# Patient Record
Sex: MALE | Race: WHITE
[De-identification: names, ages, dates, MRNs, and addresses within clinical notes are randomized per-mention and may not be internally consistent; named-entity substitution may affect disease eponyms.]

---

## 2018-03-20 ENCOUNTER — HOSPITAL ENCOUNTER (INPATIENT)
Dept: HOSPITAL 17 - NEPC | Age: 68
LOS: 3 days | Discharge: NURSING, REHAB OR LONG-TERM CARE | DRG: 623 | End: 2018-03-23
Attending: HOSPITALIST | Admitting: HOSPITALIST
Payer: MEDICARE

## 2018-03-20 VITALS
HEART RATE: 76 BPM | DIASTOLIC BLOOD PRESSURE: 61 MMHG | TEMPERATURE: 98 F | RESPIRATION RATE: 17 BRPM | OXYGEN SATURATION: 99 % | SYSTOLIC BLOOD PRESSURE: 116 MMHG

## 2018-03-20 VITALS
HEART RATE: 125 BPM | SYSTOLIC BLOOD PRESSURE: 143 MMHG | OXYGEN SATURATION: 100 % | RESPIRATION RATE: 33 BRPM | DIASTOLIC BLOOD PRESSURE: 96 MMHG

## 2018-03-20 VITALS
HEART RATE: 126 BPM | SYSTOLIC BLOOD PRESSURE: 159 MMHG | OXYGEN SATURATION: 100 % | RESPIRATION RATE: 30 BRPM | DIASTOLIC BLOOD PRESSURE: 74 MMHG

## 2018-03-20 VITALS
SYSTOLIC BLOOD PRESSURE: 116 MMHG | RESPIRATION RATE: 17 BRPM | DIASTOLIC BLOOD PRESSURE: 61 MMHG | OXYGEN SATURATION: 99 % | HEART RATE: 92 BPM | TEMPERATURE: 97.1 F

## 2018-03-20 VITALS
RESPIRATION RATE: 23 BRPM | OXYGEN SATURATION: 100 % | DIASTOLIC BLOOD PRESSURE: 59 MMHG | SYSTOLIC BLOOD PRESSURE: 125 MMHG | HEART RATE: 118 BPM

## 2018-03-20 VITALS
SYSTOLIC BLOOD PRESSURE: 151 MMHG | DIASTOLIC BLOOD PRESSURE: 65 MMHG | HEART RATE: 132 BPM | RESPIRATION RATE: 36 BRPM | OXYGEN SATURATION: 100 %

## 2018-03-20 VITALS
DIASTOLIC BLOOD PRESSURE: 62 MMHG | SYSTOLIC BLOOD PRESSURE: 133 MMHG | RESPIRATION RATE: 18 BRPM | HEART RATE: 78 BPM | TEMPERATURE: 98.2 F | OXYGEN SATURATION: 97 %

## 2018-03-20 VITALS — HEART RATE: 90 BPM

## 2018-03-20 VITALS
OXYGEN SATURATION: 100 % | RESPIRATION RATE: 29 BRPM | HEART RATE: 126 BPM | DIASTOLIC BLOOD PRESSURE: 66 MMHG | SYSTOLIC BLOOD PRESSURE: 149 MMHG

## 2018-03-20 VITALS
SYSTOLIC BLOOD PRESSURE: 116 MMHG | DIASTOLIC BLOOD PRESSURE: 55 MMHG | RESPIRATION RATE: 18 BRPM | HEART RATE: 106 BPM | OXYGEN SATURATION: 100 %

## 2018-03-20 VITALS
TEMPERATURE: 98.7 F | RESPIRATION RATE: 25 BRPM | OXYGEN SATURATION: 97 % | SYSTOLIC BLOOD PRESSURE: 101 MMHG | HEART RATE: 82 BPM | DIASTOLIC BLOOD PRESSURE: 55 MMHG

## 2018-03-20 VITALS — WEIGHT: 165.35 LBS | HEIGHT: 70 IN | BODY MASS INDEX: 23.67 KG/M2

## 2018-03-20 VITALS
SYSTOLIC BLOOD PRESSURE: 157 MMHG | RESPIRATION RATE: 30 BRPM | DIASTOLIC BLOOD PRESSURE: 87 MMHG | OXYGEN SATURATION: 100 % | HEART RATE: 129 BPM

## 2018-03-20 VITALS
OXYGEN SATURATION: 100 % | RESPIRATION RATE: 24 BRPM | SYSTOLIC BLOOD PRESSURE: 129 MMHG | HEART RATE: 120 BPM | DIASTOLIC BLOOD PRESSURE: 65 MMHG

## 2018-03-20 VITALS
DIASTOLIC BLOOD PRESSURE: 56 MMHG | HEART RATE: 112 BPM | SYSTOLIC BLOOD PRESSURE: 118 MMHG | RESPIRATION RATE: 20 BRPM | OXYGEN SATURATION: 100 %

## 2018-03-20 VITALS — HEART RATE: 83 BPM

## 2018-03-20 VITALS — SYSTOLIC BLOOD PRESSURE: 114 MMHG | DIASTOLIC BLOOD PRESSURE: 58 MMHG

## 2018-03-20 VITALS — HEART RATE: 87 BPM

## 2018-03-20 VITALS — HEART RATE: 79 BPM

## 2018-03-20 VITALS — HEART RATE: 92 BPM

## 2018-03-20 DIAGNOSIS — Z79.84: ICD-10-CM

## 2018-03-20 DIAGNOSIS — Z79.4: ICD-10-CM

## 2018-03-20 DIAGNOSIS — E11.10: Primary | ICD-10-CM

## 2018-03-20 DIAGNOSIS — Z79.82: ICD-10-CM

## 2018-03-20 DIAGNOSIS — N17.9: ICD-10-CM

## 2018-03-20 DIAGNOSIS — I10: ICD-10-CM

## 2018-03-20 DIAGNOSIS — E86.0: ICD-10-CM

## 2018-03-20 DIAGNOSIS — L89.150: ICD-10-CM

## 2018-03-20 LAB
ALBUMIN SERPL-MCNC: 2.7 GM/DL (ref 3.4–5)
ALP SERPL-CCNC: 103 U/L (ref 45–117)
ALT SERPL-CCNC: 41 U/L (ref 12–78)
AST SERPL-CCNC: 39 U/L (ref 15–37)
BACTERIA #/AREA URNS HPF: (no result) /HPF
BASOPHILS # BLD AUTO: 0.1 TH/MM3 (ref 0–0.2)
BASOPHILS NFR BLD: 0.5 % (ref 0–2)
BILIRUB SERPL-MCNC: 1 MG/DL (ref 0.2–1)
BUN SERPL-MCNC: 20 MG/DL (ref 7–18)
BUN SERPL-MCNC: 21 MG/DL (ref 7–18)
BUN SERPL-MCNC: 22 MG/DL (ref 7–18)
BUN SERPL-MCNC: 23 MG/DL (ref 7–18)
BUN SERPL-MCNC: 25 MG/DL (ref 7–18)
CALCIUM SERPL-MCNC: 7.5 MG/DL (ref 8.5–10.1)
CALCIUM SERPL-MCNC: 7.5 MG/DL (ref 8.5–10.1)
CALCIUM SERPL-MCNC: 8 MG/DL (ref 8.5–10.1)
CALCIUM SERPL-MCNC: 8.3 MG/DL (ref 8.5–10.1)
CALCIUM SERPL-MCNC: 8.5 MG/DL (ref 8.5–10.1)
CHLORIDE SERPL-SCNC: 104 MEQ/L (ref 98–107)
CHLORIDE SERPL-SCNC: 104 MEQ/L (ref 98–107)
CHLORIDE SERPL-SCNC: 108 MEQ/L (ref 98–107)
CHLORIDE SERPL-SCNC: 108 MEQ/L (ref 98–107)
CHLORIDE SERPL-SCNC: 91 MEQ/L (ref 98–107)
COLOR UR: (no result)
CREAT SERPL-MCNC: 1.2 MG/DL (ref 0.6–1.3)
CREAT SERPL-MCNC: 1.21 MG/DL (ref 0.6–1.3)
CREAT SERPL-MCNC: 1.63 MG/DL (ref 0.6–1.3)
CREAT SERPL-MCNC: 1.65 MG/DL (ref 0.6–1.3)
CREAT SERPL-MCNC: 1.93 MG/DL (ref 0.6–1.3)
EOSINOPHIL # BLD: 0.1 TH/MM3 (ref 0–0.4)
EOSINOPHIL NFR BLD: 0.4 % (ref 0–4)
ERYTHROCYTE [DISTWIDTH] IN BLOOD BY AUTOMATED COUNT: 18.3 % (ref 11.6–17.2)
GFR SERPLBLD BASED ON 1.73 SQ M-ARVRAT: 35 ML/MIN (ref 89–?)
GFR SERPLBLD BASED ON 1.73 SQ M-ARVRAT: 42 ML/MIN (ref 89–?)
GFR SERPLBLD BASED ON 1.73 SQ M-ARVRAT: 42 ML/MIN (ref 89–?)
GFR SERPLBLD BASED ON 1.73 SQ M-ARVRAT: 60 ML/MIN (ref 89–?)
GFR SERPLBLD BASED ON 1.73 SQ M-ARVRAT: 60 ML/MIN (ref 89–?)
GLUCOSE SERPL-MCNC: 143 MG/DL (ref 74–106)
GLUCOSE SERPL-MCNC: 146 MG/DL (ref 74–106)
GLUCOSE SERPL-MCNC: 345 MG/DL (ref 74–106)
GLUCOSE SERPL-MCNC: 354 MG/DL (ref 74–106)
GLUCOSE SERPL-MCNC: 709 MG/DL (ref 74–106)
GLUCOSE UR STRIP-MCNC: 1000 MG/DL
HBA1C MFR BLD: 6.4 % (ref 4.3–6)
HCO3 BLD-SCNC: 22 MEQ/L (ref 21–32)
HCO3 BLD-SCNC: 22.1 MEQ/L (ref 21–32)
HCO3 BLD-SCNC: 27 MEQ/L (ref 21–32)
HCO3 BLD-SCNC: 27.2 MEQ/L (ref 21–32)
HCO3 BLD-SCNC: 5.2 MEQ/L (ref 21–32)
HCT VFR BLD CALC: 40 % (ref 39–51)
HGB BLD-MCNC: 11.9 GM/DL (ref 13–17)
HGB UR QL STRIP: (no result)
HYALINE CASTS #/AREA URNS LPF: 3 /LPF
INR PPP: 1.3 RATIO
KETONES UR STRIP-MCNC: 80 MG/DL
LACTIC ACID SEPSIS PROTOCOL: 10.7 MMOL/L (ref 0.4–2)
LYMPHOCYTES # BLD AUTO: 1.3 TH/MM3 (ref 1–4.8)
LYMPHOCYTES NFR BLD AUTO: 6.3 % (ref 9–44)
MAGNESIUM SERPL-MCNC: 1.5 MG/DL (ref 1.5–2.5)
MAGNESIUM SERPL-MCNC: 1.5 MG/DL (ref 1.5–2.5)
MAGNESIUM SERPL-MCNC: 1.7 MG/DL (ref 1.5–2.5)
MAGNESIUM SERPL-MCNC: 1.7 MG/DL (ref 1.5–2.5)
MAGNESIUM SERPL-MCNC: 1.9 MG/DL (ref 1.5–2.5)
MCH RBC QN AUTO: 32.5 PG (ref 27–34)
MCHC RBC AUTO-ENTMCNC: 29.6 % (ref 32–36)
MCV RBC AUTO: 109.6 FL (ref 80–100)
MONOCYTE #: 0.7 TH/MM3 (ref 0–0.9)
MONOCYTES NFR BLD: 3.3 % (ref 0–8)
MUCOUS THREADS #/AREA URNS LPF: (no result) /LPF
NEUTROPHILS # BLD AUTO: 18.4 TH/MM3 (ref 1.8–7.7)
NEUTROPHILS NFR BLD AUTO: 89.5 % (ref 16–70)
NITRITE UR QL STRIP: (no result)
PHOSPHATE SERPL-MCNC: 1.5 MG/DL (ref 2.5–4.9)
PHOSPHATE SERPL-MCNC: 1.5 MG/DL (ref 2.5–4.9)
PHOSPHATE SERPL-MCNC: 2 MG/DL (ref 2.5–4.9)
PHOSPHATE SERPL-MCNC: 2.1 MG/DL (ref 2.5–4.9)
PLATELET # BLD: 264 TH/MM3 (ref 150–450)
PMV BLD AUTO: 8.6 FL (ref 7–11)
PROT SERPL-MCNC: 6.2 GM/DL (ref 6.4–8.2)
PROTHROMBIN TIME: 13.1 SEC (ref 9.8–11.6)
RBC # BLD AUTO: 3.65 MIL/MM3 (ref 4.5–5.9)
SODIUM SERPL-SCNC: 133 MEQ/L (ref 136–145)
SODIUM SERPL-SCNC: 139 MEQ/L (ref 136–145)
SODIUM SERPL-SCNC: 140 MEQ/L (ref 136–145)
SODIUM SERPL-SCNC: 143 MEQ/L (ref 136–145)
SODIUM SERPL-SCNC: 143 MEQ/L (ref 136–145)
SP GR UR STRIP: 1.01 (ref 1–1.03)
TROPONIN I SERPL-MCNC: 0.03 NG/ML (ref 0.02–0.05)
URINE LEUKOCYTE ESTERASE: (no result)
WBC # BLD AUTO: 20.5 TH/MM3 (ref 4–11)

## 2018-03-20 PROCEDURE — 85730 THROMBOPLASTIN TIME PARTIAL: CPT

## 2018-03-20 PROCEDURE — 82948 REAGENT STRIP/BLOOD GLUCOSE: CPT

## 2018-03-20 PROCEDURE — 87205 SMEAR GRAM STAIN: CPT

## 2018-03-20 PROCEDURE — 74176 CT ABD & PELVIS W/O CONTRAST: CPT

## 2018-03-20 PROCEDURE — 81001 URINALYSIS AUTO W/SCOPE: CPT

## 2018-03-20 PROCEDURE — 83880 ASSAY OF NATRIURETIC PEPTIDE: CPT

## 2018-03-20 PROCEDURE — 85025 COMPLETE CBC W/AUTO DIFF WBC: CPT

## 2018-03-20 PROCEDURE — 87641 MR-STAPH DNA AMP PROBE: CPT

## 2018-03-20 PROCEDURE — 51702 INSERT TEMP BLADDER CATH: CPT

## 2018-03-20 PROCEDURE — 84484 ASSAY OF TROPONIN QUANT: CPT

## 2018-03-20 PROCEDURE — 80048 BASIC METABOLIC PNL TOTAL CA: CPT

## 2018-03-20 PROCEDURE — 82550 ASSAY OF CK (CPK): CPT

## 2018-03-20 PROCEDURE — 82805 BLOOD GASES W/O2 SATURATION: CPT

## 2018-03-20 PROCEDURE — 82800 BLOOD PH: CPT

## 2018-03-20 PROCEDURE — 86403 PARTICLE AGGLUT ANTBDY SCRN: CPT

## 2018-03-20 PROCEDURE — 82010 KETONE BODYS QUAN: CPT

## 2018-03-20 PROCEDURE — 87086 URINE CULTURE/COLONY COUNT: CPT

## 2018-03-20 PROCEDURE — 96375 TX/PRO/DX INJ NEW DRUG ADDON: CPT

## 2018-03-20 PROCEDURE — 87040 BLOOD CULTURE FOR BACTERIA: CPT

## 2018-03-20 PROCEDURE — 83605 ASSAY OF LACTIC ACID: CPT

## 2018-03-20 PROCEDURE — 85610 PROTHROMBIN TIME: CPT

## 2018-03-20 PROCEDURE — 80053 COMPREHEN METABOLIC PANEL: CPT

## 2018-03-20 PROCEDURE — 84100 ASSAY OF PHOSPHORUS: CPT

## 2018-03-20 PROCEDURE — 83735 ASSAY OF MAGNESIUM: CPT

## 2018-03-20 PROCEDURE — 93005 ELECTROCARDIOGRAM TRACING: CPT

## 2018-03-20 PROCEDURE — 96361 HYDRATE IV INFUSION ADD-ON: CPT

## 2018-03-20 PROCEDURE — 71045 X-RAY EXAM CHEST 1 VIEW: CPT

## 2018-03-20 PROCEDURE — 82552 ASSAY OF CPK IN BLOOD: CPT

## 2018-03-20 PROCEDURE — 83036 HEMOGLOBIN GLYCOSYLATED A1C: CPT

## 2018-03-20 PROCEDURE — 87070 CULTURE OTHR SPECIMN AEROBIC: CPT

## 2018-03-20 PROCEDURE — 96374 THER/PROPH/DIAG INJ IV PUSH: CPT

## 2018-03-20 RX ADMIN — METOPROLOL TARTRATE SCH MG: 25 TABLET, FILM COATED ORAL at 23:00

## 2018-03-20 RX ADMIN — FOLIC ACID SCH MG: 1 TABLET ORAL at 09:23

## 2018-03-20 RX ADMIN — METOPROLOL TARTRATE SCH MG: 25 TABLET, FILM COATED ORAL at 09:23

## 2018-03-20 RX ADMIN — SODIUM BICARBONATE SCH MLS/HR: 84 INJECTION, SOLUTION INTRAVENOUS at 13:26

## 2018-03-20 RX ADMIN — FAMOTIDINE SCH MG: 10 INJECTION, SOLUTION INTRAVENOUS at 09:24

## 2018-03-20 RX ADMIN — GABAPENTIN SCH MG: 100 CAPSULE ORAL at 09:22

## 2018-03-20 RX ADMIN — PHENYTOIN SODIUM SCH MLS/HR: 50 INJECTION INTRAMUSCULAR; INTRAVENOUS at 11:45

## 2018-03-20 RX ADMIN — POTASSIUM CHLORIDE PRN MLS/HR: 200 INJECTION, SOLUTION INTRAVENOUS at 12:25

## 2018-03-20 RX ADMIN — MORPHINE SULFATE PRN MG: 2 INJECTION, SOLUTION INTRAMUSCULAR; INTRAVENOUS at 23:00

## 2018-03-20 RX ADMIN — MORPHINE SULFATE PRN MG: 2 INJECTION, SOLUTION INTRAMUSCULAR; INTRAVENOUS at 20:59

## 2018-03-20 RX ADMIN — SODIUM BICARBONATE SCH MLS/HR: 84 INJECTION, SOLUTION INTRAVENOUS at 08:43

## 2018-03-20 RX ADMIN — POTASSIUM CHLORIDE PRN MLS/HR: 200 INJECTION, SOLUTION INTRAVENOUS at 14:35

## 2018-03-20 RX ADMIN — ENOXAPARIN SODIUM SCH MG: 30 INJECTION SUBCUTANEOUS at 22:30

## 2018-03-20 RX ADMIN — GABAPENTIN SCH MG: 100 CAPSULE ORAL at 18:50

## 2018-03-20 RX ADMIN — ACYCLOVIR SCH UNITS: 800 TABLET ORAL at 21:00

## 2018-03-20 RX ADMIN — PHENYTOIN SODIUM SCH MLS/HR: 50 INJECTION INTRAMUSCULAR; INTRAVENOUS at 14:29

## 2018-03-20 RX ADMIN — ACYCLOVIR SCH UNITS: 800 TABLET ORAL at 22:31

## 2018-03-20 RX ADMIN — ASPIRIN SCH MG: 325 TABLET ORAL at 09:23

## 2018-03-20 RX ADMIN — SODIUM BICARBONATE SCH MLS/HR: 84 INJECTION, SOLUTION INTRAVENOUS at 18:49

## 2018-03-20 RX ADMIN — PHENYTOIN SODIUM SCH MLS/HR: 50 INJECTION INTRAMUSCULAR; INTRAVENOUS at 03:50

## 2018-03-20 RX ADMIN — STANDARDIZED SENNA CONCENTRATE AND DOCUSATE SODIUM SCH TAB: 8.6; 5 TABLET, FILM COATED ORAL at 23:00

## 2018-03-20 RX ADMIN — PHENYTOIN SODIUM SCH MLS/HR: 50 INJECTION INTRAMUSCULAR; INTRAVENOUS at 09:48

## 2018-03-20 RX ADMIN — SODIUM CHLORIDE AND POTASSIUM CHLORIDE SCH MLS/HR: 9; 1.49 INJECTION, SOLUTION INTRAVENOUS at 21:00

## 2018-03-20 RX ADMIN — INSULIN ASPART SCH: 100 INJECTION, SOLUTION INTRAVENOUS; SUBCUTANEOUS at 22:31

## 2018-03-20 RX ADMIN — FAMOTIDINE SCH MG: 10 INJECTION, SOLUTION INTRAVENOUS at 20:59

## 2018-03-20 RX ADMIN — Medication SCH MG: at 09:23

## 2018-03-20 RX ADMIN — GABAPENTIN SCH MG: 100 CAPSULE ORAL at 12:24

## 2018-03-20 RX ADMIN — STANDARDIZED SENNA CONCENTRATE AND DOCUSATE SODIUM SCH TAB: 8.6; 5 TABLET, FILM COATED ORAL at 09:23

## 2018-03-20 RX ADMIN — ENOXAPARIN SODIUM SCH MG: 30 INJECTION SUBCUTANEOUS at 09:23

## 2018-03-20 NOTE — EKG
Date Performed: 03/20/2018       Time Performed: 12:44:22

 

PTAGE:      68 years

 

EKG:      Sinus rhythm 

 

 NORMAL ECG

 

PREVIOUS TRACING       : 03/20/2018 03.31 Compared to previous tracing, heart rate has decreased.

 

DOCTOR:   Wellington Paiz  Interpretating Date/Time  03/20/2018 14:37:06
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
## 2018-03-20 NOTE — HHI.HP
HPI


Service


Critical Care Medicine


Primary Care Physician


Tod Barreto, DO


Admission Diagnosis





DKA, leukocytosis


Diagnosis:  


Travel History


International Travel<30 Days:  No


Contact w/Intl Traveler <30 Da:  No


Traveled to Known Affected Are:  No


History of Present Illness


68 year old male presents with a history altered mentation noted at 2 AM by the 

nursing home staff when they went to check his blood sugar.  The patient's 

blood sugar was noted to be critically high.  They administered 10 units of 

insulin subcutaneously and called the physician on call.  Given the patient's 

altered mentation he recommended that the patient be taken to the emergency 

department.  Upon ambulance services arrival the patient was noted to be 

tachypneic with a critically high blood sugar.  The patient was noted to have 

an end-tidal CO2 between 8 and 10.  The patient's heart rate was noted to be 

280 and he appeared to be and ventricular tachycardia.  The patient 

spontaneously converted to a sinus rhythm.  The patient continues to be 

altered.  The initial workup in the emergency department show severe DKA and 

the patient is admitted to ICU.





Review of Systems


ROS


Unable to obtain due to altered mental status





Past Family Social History


Allergies:  


Coded Allergies:  


     No Known Allergies (Verified  Allergy, Unknown, 3/20/18)


Past Medical History


Hypertension


Diabetes


History of subdural hemorrhage and subarachnoid hemorrhagein 


History of hepatitis Creports this was treated more than 10 years ago


Past Surgical History


Left femoral fracture IM rods


Left knee surgery


Halo placement: 


C6-C7 fracture subluxation with spinal cord injurystatus post anterior and 

posterior decompression and fusion


Anterior cervical C5 to C6, and C6 to C7 ventral epidural hemorrhage evacuation


Removal of anterior C5 to C7 cervical plate with replacement


Removal of C5 to C6 and C6 to C7 interbody cages with replacement


Reported Medications





Reported Meds & Active Scripts


Active


Norco 5-325 mg (Hydrocodone-Acetaminophen 5-325 mg) 1 Tab 1 Tab PO Q8 PRN


Trazodone Hcl (Trazodone HCl) 50 Mg Tab 50 Mg PO HS


Abilify 20 mg (Aripiprazole) 20 Mg Tab 20 Mg PO HS 30 Days


Metoprolol Tartrate 25 mg (Metoprolol Tartrate) 25 Mg Tab 25 Mg PO Q12HR 30 Days


Lovenox (Enoxaparin Sodium) 30 Mg/0.3 Ml Inj 30 Mg SQ Q12H 30 Days


Aspirin 325 Mg Tab (Aspirin) 325 Mg Tab 325 Mg PO DAILY 30 Days


Reported


Glucophage 500 mg (Metformin HCl) 500 Mg Tab 500 Mg PO BIDPC


Mapap (Acetaminophen) 325 Mg Tab 650 Mg PO Q6H PRN


Thiamine HCl 100 Mg Tab 100 Mg PO DAILY


Miralax 119 Gm Bottle (Polyethylene Glycol) 119 Gm Powd 17 Gm PO DAILY


     17 GRAMS = 1 TABLESPOON DISSOLVED IN 4 TO 8 OUNCES OF BEVERAGE


Lisinopril 5 mg (Lisinopril) 5 Mg Tab 5 Mg PO BID


Humalog Insulin Supplemental Scale (Insulin Human Lispro) 100 Units/Ml  Inj 

Unknown Dose SQ TIDACHS


     Low Dose Lispro Insulin Sliding Scale = Max dose at bedtime:(  )units;


     Max dose at 3am:(  ); blood sugars less than 70 take zero insulin


     units; blood sugars 150-199 take 1 unit; blood sugars 200-249 take 3


     units; blood sugars 250-299 take 5 units; blood sugars 300-349 take 7


     units; blood sugars greater than 349 take 9 units


Lantus (Insulin Glargine) 100 Units/Ml Inj 27 Unit SQ DAILY


Gabapentin 100 Mg Cap 200 Mg PO TID


Folate 1 Mg Tab (Folic Acid) 1 Mg Tab 1 Mg PO DAILY


Colace 100 Mg Cap (Docusate Sodium) 100 Mg Cap 100 Mg PO BID


Active Ordered Medications





Current Medications








 Medications


  (Trade)  Dose


 Ordered  Sig/Oral


 Route


 PRN Reason  Start Time


 Stop Time Status Last Admin


Dose Admin


 


 Sodium Chloride  1,000 ml @ 


 250 mls/hr  Q4H


 IV


   3/20/18 03:45


    3/20/18 03:50


 


 


 Dextrose/Sodium


 Chloride  1,000 ml @ 


 200 mls/hr  Q5H


 IV


   3/20/18 03:45


     


 


 


 Insulin Human


 Regular 100 units/


 Sodium Chloride  100 ml @ 


 6.5 mls/hr  TITRATE  PRN


 IV


 Blood Sugar Management  3/20/18 03:45


     


 


 


 Potassium Chloride  100 ml @ 


 100 mls/hr  Q1H  PRN


 IV


 SEE LABEL COMMENTS  3/20/18 03:45


     


 


 


 Potassium Chloride  100 ml @ 


 50 mls/hr  Q2H  PRN


 IV


 SEE LABEL COMMENTS  3/20/18 03:45


     


 


 


 Potassium Chloride  100 ml @ 


 100 mls/hr  Q1H  PRN


 IV


 SEE LABEL COMMENTS  3/20/18 03:45


     


 


 


 Potassium Chloride  100 ml @ 


 100 mls/hr  Q1H  PRN


 IV


 SEE LABEL COMMENTS  3/20/18 03:45


     


 


 


 Potassium Chloride  100 ml @ 


 50 mls/hr  Q2H  PRN


 IV


 SEE LABEL COMMENTS  3/20/18 03:45


     


 


 


 Potassium Chloride  100 ml @ 


 50 mls/hr  Q2H  PRN


 IV


 SEE LABEL COMMENTS  3/20/18 03:45


     


 


 


 Potassium Chloride  100 ml @ 


 50 mls/hr  Q2H  PRN


 IV


 SEE LABEL COMMENTS  3/20/18 03:45


     


 


 


 Potassium Chloride  100 ml @ 


 50 mls/hr  Q2H  PRN


 IV


 SEE LABEL COMMENTS  3/20/18 03:45


     


 


 


 Sodium Bicarbonate


  (Sodium


 Bicarbonate 8.4%


 Inj)  100 meq  UNSCH  PRN


 IV PUSH


 SEE LABEL COMMENTS  3/20/18 03:45


     


 


 


 Sodium Bicarbonate


  (Sodium


 Bicarbonate 8.4%


 Inj)  50 meq  UNSCH  PRN


 IV PUSH


 SEE LABEL COMMENTS  3/20/18 03:45


     


 


 


 Sodium Phosphate


 15 mmol/Sodium


 Chloride  105 ml @ 


 25 mls/hr  UNSCH  PRN


 IV


 SEE LABEL COMMENTS  3/20/18 03:45


     


 


 


 Vancomycin HCl


 1000 mg/Sodium


 Chloride  250 ml @ 


 250 mls/hr  ONCE  ONCE


 IV


   3/20/18 05:00


 3/20/18 05:59   


 


 


 Acetaminophen


  (Tylenol)  650 mg  Q6H  PRN


 PO


 MILD PAIN  3/20/18 05:30


   UNV  


 


 


 Aripiprazole


  (Abilify)  20 mg  HS


 PO


   3/20/18 21:00


   UNV  


 


 


 Aspirin


  (Aspirin)  325 mg  DAILY


 PO


   3/20/18 09:00


   UNV  


 


 


 Enoxaparin Sodium


  (Lovenox Inj)  30 mg  Q12H


 SQ


   3/20/18 05:30


   UNV  


 


 


 Folic Acid


  (Folate)  1 mg  DAILY


 PO


   3/20/18 09:00


   UNV  


 


 


 Gabapentin


  (Neurontin)  200 mg  TID


 PO


   3/20/18 09:00


   UNV  


 


 


 Metoprolol


 Tartrate


  (Lopressor)  25 mg  Q12HR


 PO


   3/20/18 09:00


   UNV  


 


 


 Thiamine HCl


  (Vitamin B1)  100 mg  DAILY


 PO


   3/20/18 09:00


   UNV  


 


 


 Trazodone HCl


  (Desyrel)  50 mg  HS


 PO


   3/20/18 21:00


   UNV  


 


 


 Sodium Chloride


  (NS Flush)  2 ml  UNSCH  PRN


 IV FLUSH


 FLUSH AFTER USING IV ACCESS  3/20/18 05:30


   UNV  


 


 


 Sodium Chloride


  (NS Flush)  2 ml  BID


 IV FLUSH


   3/20/18 09:00


   UNV  


 


 


 Acetaminophen


  (Tylenol)  650 mg  Q6H  PRN


 PO


 PAIN 1-5 AND/OR FEVER >101F  3/20/18 05:30


   UNV  


 


 


 Morphine Sulfate


  (Morphine Inj)  2 mg  Q2H  PRN


 IV PUSH


 PAIN SCALE 6 TO 10  3/20/18 05:30


   UNV  


 


 


 Famotidine


  (Pepcid Inj)  20 mg  Q12HR


 IV PUSH


   3/20/18 09:00


   UNV  


 


 


 Temazepam


  (Restoril)  15 mg  HS  PRN


 PO


 INSOMNIA  3/20/18 05:30


   UNV  


 


 


 Albuterol/


 Ipratropium


  (Duoneb Neb)  1 ampule  Q2HR NEB  PRN


 INH


 WHEEZING  3/20/18 05:30


   UNV  


 


 


 Miscellaneous


 Information  1  Q361D


 XX


   3/20/18 05:30


   UNV  


 


 


 Chlorhexidine


 Gluconate


  (Chlorhexidine


 2% Cloth)  3 pack


 Taper  DAILY@04


 TOP


   3/21/18 04:00


 3/17/19 03:59 UNV  


 


 


 Chlorhexidine


 Gluconate


  (Chlorhexidine


 2% Cloth)  3 pack  UNSCH  PRN


 TOP


 HYGIENIC CARE  3/20/18 05:30


   UNV  


 


 


 Senna/Docusate


 Sodium


  (Lin-Colace)  1 tab  BID


 PO


   3/20/18 09:00


   UNV  


 


 


 Magnesium


 Hydroxide


  (Milk Of


 Magnesia Liq)  30 ml  Q12H  PRN


 PO


 Mild constipation  3/20/18 05:30


   UNV  


 


 


 Sennosides


  (Senokot)  17.2 mg  Q12H  PRN


 PO


 Moderate constipation  3/20/18 05:30


   UNV  


 


 


 Bisacodyl


  (Dulcolax Supp)  10 mg  DAILY  PRN


 RECTAL


 SEVERE CONSITIPATION  3/20/18 05:30


   UNV  


 


 


 Lactulose


  (Lactulose Liq)  30 ml  DAILY  PRN


 PO


 SEVERE CONSITIPATION  3/20/18 05:30


   UNV  


 


 


 Sodium Chloride  1,000 ml @ 


 250 mls/hr  Q4H


 IV


   3/20/18 05:29


   UNV  


 


 


 Dextrose/Sodium


 Chloride  1,000 ml @ 


 200 mls/hr  Q5H


 IV


   3/20/18 05:29


   UNV  


 


 


 Insulin Human


 Regular 100 units/


 Sodium Chloride  100 ml @ 


 6.5 mls/hr  TITRATE  PRN


 IV


 Blood Glucose Control  3/20/18 05:30


   UNV  


 


 


 Potassium Chloride  100 ml @ 


 100 mls/hr  Q1H  PRN


 IV


 SEE LABEL COMMENTS  3/20/18 05:30


   UNV  


 


 


 Potassium Chloride  100 ml @ 


 50 mls/hr  Q2H  PRN


 IV


 SEE LABEL COMMENTS  3/20/18 05:30


   UNV  


 


 


 Potassium Chloride  100 ml @ 


 100 mls/hr  Q1H  PRN


 IV


 SEE LABEL COMMENTS  3/20/18 05:30


   UNV  


 


 


 Potassium Chloride  100 ml @ 


 100 mls/hr  Q1H  PRN


 IV


 SEE LABEL COMMENTS  3/20/18 05:30


   UNV  


 


 


 Potassium Chloride  100 ml @ 


 50 mls/hr  Q2H  PRN


 IV


 SEE LABEL COMMENTS  3/20/18 05:30


   UNV  


 


 


 Potassium Chloride  100 ml @ 


 50 mls/hr  Q2H  PRN


 IV


 SEE LABEL COMMENTS  3/20/18 05:30


   UNV  


 


 


 Potassium Chloride  100 ml @ 


 50 mls/hr  Q2H  PRN


 IV


 SEE LABEL COMMENTS  3/20/18 05:30


   UNV  


 


 


 Potassium Chloride  100 ml @ 


 50 mls/hr  Q2H  PRN


 IV


 SEE LABEL COMMENTS  3/20/18 05:30


   UNV  


 


 


 Sodium Bicarbonate


  (Sodium


 Bicarbonate 8.4%


 Inj)  100 meq  UNSCH  PRN


 IV PUSH


 SEE LABEL COMMENTS  3/20/18 05:30


   UNV  


 


 


 Sodium Bicarbonate


  (Sodium


 Bicarbonate 8.4%


 Inj)  50 meq  UNSCH  PRN


 IV PUSH


 SEE LABEL COMMENTS  3/20/18 05:30


   UNV  


 


 


 Sodium Phosphate


 15 mmol/Sodium


 Chloride  105 ml @ 


 25 mls/hr  UNSCH  PRN


 IV


 SEE LABEL COMMENTS  3/20/18 05:30


   UNV  


 








Family History


No family history significant for coronary artery disease or malignancy


Social History


Denies smoking/alcohol abuse/drug abuse.





Physical Exam


Vital Signs





Vital Signs








  Date Time  Temp Pulse Resp B/P (MAP) Pulse Ox O2 Delivery O2 Flow Rate FiO2


 


3/20/18 03:40  126 30 159/74 (102) 100 Room Air  


 


3/20/18 03:30  129 30 157/87 (110) 100 Room Air  


 


3/20/18 03:24  125 33 143/96 (112) 100   








Physical Exam


GENERAL: Well-nourished, well-developed patient.


SKIN: Warm and dry.


HEAD: Normocephalic.


EYES: No scleral icterus. No injection or drainage. 


NECK: Supple, trachea midline. No JVD or lymphadenopathy.


CARDIOVASCULAR: Regular rate and rhythm without murmurs, gallops, or rubs. 


RESPIRATORY: Breath sounds equal bilaterally. No accessory muscle use.


GASTROINTESTINAL: Abdomen soft, non-tender, nondistended. 


MUSCULOSKELETAL: No cyanosis, or edema. 


BACK: Large sacral decubitus wound. 


NEURO EXAM: The patient is confused on examination.  He has difficulty 

following simple commands like opening his mouth.  He is spontaneously moving 

all extremities equally with 5/5 strength.  The patient is oriented to to his 

name only.


Laboratory





Laboratory Tests








Test


  3/20/18


03:20 3/20/18


03:30 3/20/18


03:45 3/20/18


04:06


 


Blood Gas Puncture Site IV    


 


Blood Gas Patient Temperature 98.6    


 


Venous Blood pH 7.06    


 


Venous Blood Partial Pressure


CO2 14 


  


  


  


 


 


Venous Blood Partial Pressure


O2 43 


  


  


  


 


 


Venous Blood HCO3 4    


 


Venous Blood Oxygen Saturation 51    


 


Venous Blood Oxygen Content 8.2    


 


Venous Blood Base Excess -24.8    


 


Oxygen Delivery Device ROOM AIR    


 


Blood Gas Inspired Oxygen 21    


 


Bedside Hemoglobin  12.2   


 


Bedside Hematocrit  36.0   


 


Bedside Sodium  129   


 


Blood Urea Nitrogen  25   


 


Creatinine  1.93   


 


Random Glucose  709   


 


Total Protein  6.2   


 


Albumin  2.7   


 


Calcium Level  8.5   


 


Magnesium Level  1.9   


 


Alkaline Phosphatase  103   


 


Aspartate Amino Transf


(AST/SGOT) 


  39 


  


  


 


 


Alanine Aminotransferase


(ALT/SGPT) 


  41 


  


  


 


 


Total Bilirubin  1.0   


 


Sodium Level  133   


 


Potassium Level  6.0   


 


Chloride Level  91   


 


Carbon Dioxide Level  5.2   


 


Bedside Potassium  5.9   


 


Bedside Chloride  99   


 


Anion Gap  37   


 


Bedside Blood Urea Nitrogen  25   


 


Bedside Creatinine  1.4   


 


Estimat Glomerular Filtration


Rate 


  35 


  


  


 


 


Bedside Glucose


  


  GREATER THAN


700 


  


 


 


Total Creatine Kinase  286   


 


Creatine Kinase MB  8.6   


 


Troponin I  0.03   


 


B-Hydroxybutyrate  10.31   


 


White Blood Count   20.5  


 


Red Blood Count   3.65  


 


Hemoglobin   11.9  


 


Hematocrit   40.0  


 


Mean Corpuscular Volume   109.6  


 


Mean Corpuscular Hemoglobin   32.5  


 


Mean Corpuscular Hemoglobin


Concent 


  


  29.6 


  


 


 


Red Cell Distribution Width   18.3  


 


Platelet Count   264  


 


Mean Platelet Volume   8.6  


 


Neutrophils (%) (Auto)   89.5  


 


Lymphocytes (%) (Auto)   6.3  


 


Monocytes (%) (Auto)   3.3  


 


Eosinophils (%) (Auto)   0.4  


 


Basophils (%) (Auto)   0.5  


 


Neutrophils # (Auto)   18.4  


 


Lymphocytes # (Auto)   1.3  


 


Monocytes # (Auto)   0.7  


 


Eosinophils # (Auto)   0.1  


 


Basophils # (Auto)   0.1  


 


CBC Comment   AUTO DIFF  


 


Differential Comment


  


  


  AUTO DIFF


CONFIRMED 


 


 


B-Type Natriuretic Peptide   438  


 


Urine Color    LIGHT-YELLOW 


 


Urine Turbidity    CLEAR 


 


Urine pH    5.0 


 


Urine Specific Gravity    1.013 


 


Urine Protein    NEG 


 


Urine Glucose (UA)    1000 


 


Urine Ketones    80 


 


Urine Occult Blood    NEG 


 


Urine Nitrite    NEG 


 


Urine Bilirubin    NEG 


 


Urine Urobilinogen    LESS THAN 2.0 


 


Urine Leukocyte Esterase    NEG 


 


Urine RBC    1 


 


Urine WBC    1 


 


Urine Bacteria    OCC 


 


Urine Hyaline Casts    3 


 


Urine Mucus    FEW 


 


Microscopic Urinalysis Comment


  


  


  


  CULT NOT


INDICATED








Result Diagram:  


3/20/18 0345                                                                   

             3/20/18 0330





Imaging





Last 24 hours Impressions








Chest X-Ray 3/20/18 0333 Signed





Impressions: 





 Service Date/Time:  2018 04:01 - CONCLUSION: The lungs are 





 clear.     Thomas J. Yuschok, MD Caprini VTE Risk Assessment


Caprini VTE Risk Assessment:  Mod/High Risk (score >= 2)


Caprini Risk Assessment Model











 Point Value = 1          Point Value = 2  Point Value = 3  Point Value = 5


 


Age 41-60


Minor surgery


BMI > 25 kg/m2


Swollen legs


Varicose veins


Pregnancy or postpartum


History of unexplained or recurrent


   spontaneous 


Oral contraceptives or hormone


   replacement


Sepsis (< 1 month)


Serious lung disease, including


   pneumonia (< 1 month)


Abnormal pulmonary function


Acute myocardial infarction


Congestive heart failure (< 1 month)


History of inflammatory bowel disease


Medical patient at bed rest Age 61-74


Arthroscopic surgery


Major open surgery (> 45 min)


Laparoscopic surgery (> 45 min)


Malignancy


Confined to bed (> 72 hours)


Immobilizing plaster cast


Central venous access Age >= 75


History of VTE


Family history of VTE


Factor V Leiden


Prothrombin [id number]


Lupus anticoagulant


Anticardiolipin antibodies


Elevated serum homocysteine


Heparin-induced thrombocytopenia


Other congenital or acquired


   thrombophilia Stroke (< 1 month)


Elective arthroplasty


Hip, pelvis, or leg fracture


Acute spinal cord injury (< 1 month)








Prophylaxis Regimen











   Total Risk


Factor Score Risk Level Prophylaxis Regimen


 


0-1      Low Early ambulation


 


2 Moderate Order ONE of the following:


*Sequential Compression Device (SCD)


*Heparin 5000 units SQ BID


 


3-4 Higher Order ONE of the following medications:


*Heparin 5000 units SQ TID


*Enoxaparin/Lovenox 40 mg SQ daily (WT < 150 kg, CrCl > 30 mL/min)


*Enoxaparin/Lovenox 30 mg SQ daily (WT < 150 kg, CrCl > 10-29 mL/min)


*Enoxaparin/Lovenox 30 mg SQ BID (WT < 150 kg, CrCl > 30 mL/min)


AND/OR


*Sequential Compression Device (SCD)


 


5 or more Highest Order ONE of the following medications:


*Heparin 5000 units SQ TID (Preferred with Epidurals)


*Enoxaparin/Lovenox 40 mg SQ daily (WT < 150 kg, CrCl > 30 mL/min)


*Enoxaparin/Lovenox 30 mg SQ daily (WT < 150 kg, CrCl > 10-29 mL/min)


*Enoxaparin/Lovenox 30 mg SQ BID (WT < 150 kg, CrCl > 30 mL/min)


AND


*Sequential Compression Device (SCD)











Assessment and Plan


Assessment and Plan


DKA


- IV insulin per protocol


- Series chemistry profile


- Potassium replacement


- Sodium bicarbonate when necessary


- Panculture


- CXR negative


- Series of troponins and EKGs





Hypertension


- Metoprolol


- Hold lisinopril due to acute kidney injury





Acute Renal failure


- IV hydration


- Monitor creatinine and electrolytes level


- Strict I's and O's


- CT abdomen pelvis pending - consider ultrasound of kidneys based on results





Sacral Decubitus


- Wound care consult





DVT GI prophylaxis


- Teds SCDs


- Subcutaneous heparin


- Pepcid





Critical Care:


The total critical care time was 35 minutes. Time to perform other separately 

billable procedures was not included in the critical care time.











Oswald John MD Mar 20, 2018 05:41


Jona Vazquez MD Mar 20, 2018 09:00

## 2018-03-20 NOTE — RADRPT
EXAM DATE/TIME:  03/20/2018 04:01 

 

[facility] COMPARISON:     

CHEST SINGLE AP, May 29, 2016, 5:50.

 

                     

INDICATIONS :     

Short of breath.

                     

 

MEDICAL HISTORY :            

Hepatitis C. Diabetes mellitus type II.   

 

SURGICAL HISTORY :     

None.   

 

ENCOUNTER:     

Initial                                        

 

ACUITY:     

1 day      

 

PAIN SCORE:     

0/10

 

LOCATION:     

Bilateral chest 

 

FINDINGS:     

A single view of the chest demonstrates the lungs to be symmetrically aerated without evidence of mas
s, infiltrate or effusion.  Incidental note of azygous lobe.  The cardiomediastinal contours are unre
markable.  No tortuosity of the descending thoracic aorta.  Cervical hardware plate.  Stable deformit
y left shoulder and upper lateral left ribs.

 

CONCLUSION:     The lungs are clear.

 

 

 

 Rodríguez Chao MD on March 20, 2018 at 4:42           

Board Certified Radiologist.

 This report was verified electronically.

## 2018-03-20 NOTE — RADRPT
EXAM DATE/TIME:  03/20/2018 05:44 

 

[facility] COMPARISON:     

No previous studies available for comparison.

 

 

INDICATIONS :     

Abdomen pain.

                  

 

ORAL CONTRAST:      

No oral contrast ingested.

                  

 

RADIATION DOSE:     

10.21 CTDIvol (mGy) 

 

 

MEDICAL HISTORY :     

Cardiovascular disease. Hypertension. UTI; Liver disease.

 

SURGICAL HISTORY :      

None. 

 

ENCOUNTER:      

Initial

 

ACUITY:      

1 day

 

PAIN SCALE:      

3/10

 

LOCATION:       

Bilateral  abdomen

 

TECHNIQUE:     

Volumetric scanning of the abdomen and pelvis was performed.  Using automated exposure control and ad
justment of the mA and/or kV according to patient size, radiation dose was kept as low as reasonably 
achievable to obtain optimal diagnostic quality images.  DICOM format image data is available electro
nically for review and comparison.  

 

FINDINGS:     

There is motion degradation of the upper abdominal images.  The study is still of diagnostic quality.
  There is a tiny right pleural effusion measuring 1.2 cm.  Moderate amount of ascites in the upper a
bdomen, tracking down the paracolic gutters bilaterally and with a moderate amount of fluid in the pe
lvis.  No dilated loops of small or large bowel.  The liver, gallbladder, spleen, kidneys, pancreas, 
adrenal glands, and aorta are unremarkable for noncontrast technique.  Gaxiola catheter within the urin
shelly bladder.  Osseous structures are grossly intact.  Left femoral intramedullary carly and intratrocha
nteric nail.

 

CONCLUSION:     

Moderate amount of ascites in the abdomen and pelvis and tiny right pleural effusion.

 

 

 

 Rodríguez Chao MD on March 20, 2018 at 6:18           

Board Certified Radiologist.

 This report was verified electronically.

## 2018-03-20 NOTE — EKG
Date Performed: 03/20/2018       Time Performed: 03:31:32

 

PTAGE:      68 years

 

EKG:      SINUS TACHYCARDIA WITH FIRST DEGREE AV BLOCK BASELINE ARTEFACT ABNORMAL ECG 

 

NO PREVIOUS TRACING            

 

DOCTOR:   Wellington Paiz  Interpretating Date/Time  03/20/2018 08:25:23

## 2018-03-20 NOTE — HHI.CCPN
Subjective


Remarks/Hospital Course


68 year old male presents with a history altered mentation noted at 2 AM by the 

nursing home staff when they went to check his blood sugar.  The patient's 

blood sugar was noted to be critically high.  They administered 10 units of 

insulin subcutaneously and called the physician on call.  Given the patient's 

altered mentation he recommended that the patient be taken to the emergency 

department.  Upon ambulance services arrival the patient was noted to be 

tachypneic with a critically high blood sugar.  The patient was noted to have 

an end-tidal CO2 between 8 and 10.  The patient's heart rate was noted to be 

280 and he appeared to be and ventricular tachycardia.  The patient 

spontaneously converted to a sinus rhythm.  The patient continues to be 

altered.  The initial workup in the emergency department show severe DKA and 

the patient is admitted to ICU.





03/20 1930 hours: Bicarb corrected, gap closed, glucose controlled. D/C insulin 

gtt, d/c D5 iv fluid. Start levemir hs, feed CC diet. Add SSI.





Objective





Vital Signs








  Date Time  Temp Pulse Resp B/P (MAP) Pulse Ox O2 Delivery O2 Flow Rate FiO2


 


3/20/18 14:32     98 Room Air  


 


3/20/18 14:00  79      


 


3/20/18 12:00 98.0  17 116/61 (79)    


 


3/20/18 07:36       2.00 














Intake and Output   


 


 3/20/18 3/20/18 3/21/18





 08:00 16:00 00:00


 


Intake Total 2480 ml 1300 ml 


 


Balance 2480 ml 1300 ml 








Result Diagram:  


3/20/18 0345                                                                   

             3/20/18 1546





Other Results





Laboratory Tests








Test


  3/20/18


03:20


 


Blood Gas Puncture Site IV 


 


Blood Gas Patient Temperature 98.6 


 


Venous Blood pH


  7.06


(7.360-7.400)


 


Venous Blood Partial Pressure


CO2 14 mmHg


(44-48)


 


Venous Blood Partial Pressure


O2 43 mmHg


(35-40)


 


Venous Blood HCO3


  4 mmol/L


(22-26)


 


Venous Blood Oxygen Saturation 51 % (70-76) 


 


Venous Blood Oxygen Content


  8.2 Vol %


(9.0-17.0)


 


Venous Blood Base Excess


  -24.8 mmol/L


(-2-2)


 


Oxygen Delivery Device ROOM AIR 


 


Blood Gas Inspired Oxygen 21 % 








Imaging





Last 24 hours Impressions








Chest X-Ray 3/20/18 0333 Signed





Impressions: 





 Service Date/Time:  Tuesday, March 20, 2018 04:01 - CONCLUSION: The lungs are 





 clear.     Rodríguez Chao MD 








Objective Remarks


GENERAL: Elderly, frail patient.


SKIN: Warm and dry.


HEAD: Normocephalic.


EYES: No scleral icterus. No injection or drainage. 


NECK: Supple, trachea midline. 


CARDIOVASCULAR: Regular rate and rhythm without murmurs, gallops, or rubs. 


RESPIRATORY: Breath sounds equal bilaterally. No accessory muscle use.


GASTROINTESTINAL: Abdomen soft, non-tender, nondistended. BS active


MUSCULOSKELETAL: No cyanosis, or edema. Well perfused.


BACK: Large sacral decubitus wound. 


NEURO EXAM: Conversant. Alert.  He is spontaneously moving all extremities 

equally with 5/5 strength.  The patient is oriented to to his name only.





A/P


Assessment and Plan


DKA


- IV insulin per protocol -> convert to levemir and SSI


- Series chemistry profile


- Potassium replacement


- Sodium bicarbonate when necessary


- Panculture


- CXR negative


- Series of troponins and EKGs





Hypertension


- Metoprolol


- Hold lisinopril due to acute kidney injury





Acute Renal failure


- IV hydration


- Monitor creatinine and electrolytes level


- Strict I's and O's


- CT abdomen pelvis pending - consider ultrasound of kidneys based on results





Sacral Decubitus


- Wound care consult





DVT GI prophylaxis


- Teds SCDs


- Subcutaneous heparin


- Pepcid





Overall impression: DKA resolved. Hungry. Feed. Have wound care nurse evaluate 

sacral decubitus and make recommendations. Try to get back to SNF.











Jona Vazquez MD Mar 20, 2018 19:38

## 2018-03-21 VITALS
DIASTOLIC BLOOD PRESSURE: 99 MMHG | OXYGEN SATURATION: 94 % | RESPIRATION RATE: 20 BRPM | HEART RATE: 68 BPM | TEMPERATURE: 98.1 F | SYSTOLIC BLOOD PRESSURE: 177 MMHG

## 2018-03-21 VITALS
OXYGEN SATURATION: 97 % | HEART RATE: 63 BPM | RESPIRATION RATE: 12 BRPM | DIASTOLIC BLOOD PRESSURE: 60 MMHG | TEMPERATURE: 98.2 F | SYSTOLIC BLOOD PRESSURE: 129 MMHG

## 2018-03-21 VITALS
HEART RATE: 60 BPM | RESPIRATION RATE: 15 BRPM | OXYGEN SATURATION: 96 % | DIASTOLIC BLOOD PRESSURE: 67 MMHG | SYSTOLIC BLOOD PRESSURE: 106 MMHG | TEMPERATURE: 98.2 F

## 2018-03-21 VITALS
HEART RATE: 87 BPM | SYSTOLIC BLOOD PRESSURE: 110 MMHG | DIASTOLIC BLOOD PRESSURE: 56 MMHG | TEMPERATURE: 98 F | RESPIRATION RATE: 14 BRPM | OXYGEN SATURATION: 94 %

## 2018-03-21 VITALS
TEMPERATURE: 97.2 F | OXYGEN SATURATION: 97 % | DIASTOLIC BLOOD PRESSURE: 62 MMHG | HEART RATE: 67 BPM | RESPIRATION RATE: 18 BRPM | SYSTOLIC BLOOD PRESSURE: 106 MMHG

## 2018-03-21 VITALS
RESPIRATION RATE: 14 BRPM | TEMPERATURE: 98.3 F | HEART RATE: 63 BPM | SYSTOLIC BLOOD PRESSURE: 128 MMHG | DIASTOLIC BLOOD PRESSURE: 79 MMHG | OXYGEN SATURATION: 94 %

## 2018-03-21 VITALS — HEART RATE: 67 BPM

## 2018-03-21 VITALS — DIASTOLIC BLOOD PRESSURE: 68 MMHG | SYSTOLIC BLOOD PRESSURE: 108 MMHG | HEART RATE: 63 BPM

## 2018-03-21 VITALS — HEART RATE: 61 BPM

## 2018-03-21 VITALS — HEART RATE: 63 BPM

## 2018-03-21 VITALS — HEART RATE: 73 BPM

## 2018-03-21 LAB
ALBUMIN SERPL-MCNC: 1.8 GM/DL (ref 3.4–5)
ALP SERPL-CCNC: 74 U/L (ref 45–117)
ALT SERPL-CCNC: 27 U/L (ref 12–78)
AST SERPL-CCNC: 30 U/L (ref 15–37)
BASOPHILS # BLD AUTO: 0 TH/MM3 (ref 0–0.2)
BASOPHILS NFR BLD: 0.7 % (ref 0–2)
BILIRUB SERPL-MCNC: 0.4 MG/DL (ref 0.2–1)
BUN SERPL-MCNC: 17 MG/DL (ref 7–18)
BUN SERPL-MCNC: 19 MG/DL (ref 7–18)
BUN SERPL-MCNC: 20 MG/DL (ref 7–18)
CALCIUM SERPL-MCNC: 7.5 MG/DL (ref 8.5–10.1)
CALCIUM SERPL-MCNC: 7.5 MG/DL (ref 8.5–10.1)
CALCIUM SERPL-MCNC: 7.9 MG/DL (ref 8.5–10.1)
CHLORIDE SERPL-SCNC: 106 MEQ/L (ref 98–107)
CHLORIDE SERPL-SCNC: 108 MEQ/L (ref 98–107)
CHLORIDE SERPL-SCNC: 108 MEQ/L (ref 98–107)
CREAT SERPL-MCNC: 0.79 MG/DL (ref 0.6–1.3)
CREAT SERPL-MCNC: 1.09 MG/DL (ref 0.6–1.3)
CREAT SERPL-MCNC: 1.1 MG/DL (ref 0.6–1.3)
EOSINOPHIL # BLD: 0.1 TH/MM3 (ref 0–0.4)
EOSINOPHIL NFR BLD: 3.1 % (ref 0–4)
ERYTHROCYTE [DISTWIDTH] IN BLOOD BY AUTOMATED COUNT: 16.2 % (ref 11.6–17.2)
GFR SERPLBLD BASED ON 1.73 SQ M-ARVRAT: 67 ML/MIN (ref 89–?)
GFR SERPLBLD BASED ON 1.73 SQ M-ARVRAT: 67 ML/MIN (ref 89–?)
GFR SERPLBLD BASED ON 1.73 SQ M-ARVRAT: 98 ML/MIN (ref 89–?)
GLUCOSE SERPL-MCNC: 138 MG/DL (ref 74–106)
GLUCOSE SERPL-MCNC: 277 MG/DL (ref 74–106)
GLUCOSE SERPL-MCNC: 93 MG/DL (ref 74–106)
HCO3 BLD-SCNC: 18.7 MEQ/L (ref 21–32)
HCO3 BLD-SCNC: 24.8 MEQ/L (ref 21–32)
HCO3 BLD-SCNC: 25.2 MEQ/L (ref 21–32)
HCT VFR BLD CALC: 29.7 % (ref 39–51)
HGB BLD-MCNC: 10 GM/DL (ref 13–17)
INR PPP: 1.3 RATIO
LYMPHOCYTES # BLD AUTO: 1.2 TH/MM3 (ref 1–4.8)
LYMPHOCYTES NFR BLD AUTO: 25.1 % (ref 9–44)
MAGNESIUM SERPL-MCNC: 1.5 MG/DL (ref 1.5–2.5)
MAGNESIUM SERPL-MCNC: 1.6 MG/DL (ref 1.5–2.5)
MAGNESIUM SERPL-MCNC: 1.6 MG/DL (ref 1.5–2.5)
MCH RBC QN AUTO: 32.4 PG (ref 27–34)
MCHC RBC AUTO-ENTMCNC: 33.8 % (ref 32–36)
MCV RBC AUTO: 95.8 FL (ref 80–100)
MONOCYTE #: 0.2 TH/MM3 (ref 0–0.9)
MONOCYTES NFR BLD: 4.1 % (ref 0–8)
NEUTROPHILS # BLD AUTO: 3.1 TH/MM3 (ref 1.8–7.7)
NEUTROPHILS NFR BLD AUTO: 67 % (ref 16–70)
PHOSPHATE SERPL-MCNC: 1.5 MG/DL (ref 2.5–4.9)
PHOSPHATE SERPL-MCNC: 1.6 MG/DL (ref 2.5–4.9)
PHOSPHATE SERPL-MCNC: 1.8 MG/DL (ref 2.5–4.9)
PLATELET # BLD: 85 TH/MM3 (ref 150–450)
PMV BLD AUTO: 8.2 FL (ref 7–11)
PROT SERPL-MCNC: 4.4 GM/DL (ref 6.4–8.2)
PROTHROMBIN TIME: 13.6 SEC (ref 9.8–11.6)
RBC # BLD AUTO: 3.1 MIL/MM3 (ref 4.5–5.9)
SODIUM SERPL-SCNC: 138 MEQ/L (ref 136–145)
SODIUM SERPL-SCNC: 141 MEQ/L (ref 136–145)
SODIUM SERPL-SCNC: 141 MEQ/L (ref 136–145)
WBC # BLD AUTO: 4.6 TH/MM3 (ref 4–11)

## 2018-03-21 RX ADMIN — INSULIN ASPART SCH: 100 INJECTION, SOLUTION INTRAVENOUS; SUBCUTANEOUS at 12:00

## 2018-03-21 RX ADMIN — INSULIN ASPART SCH: 100 INJECTION, SOLUTION INTRAVENOUS; SUBCUTANEOUS at 08:00

## 2018-03-21 RX ADMIN — Medication SCH MG: at 08:57

## 2018-03-21 RX ADMIN — GABAPENTIN SCH MG: 100 CAPSULE ORAL at 13:27

## 2018-03-21 RX ADMIN — ACYCLOVIR SCH UNITS: 800 TABLET ORAL at 09:00

## 2018-03-21 RX ADMIN — GABAPENTIN SCH MG: 100 CAPSULE ORAL at 17:35

## 2018-03-21 RX ADMIN — ACYCLOVIR SCH UNITS: 800 TABLET ORAL at 21:00

## 2018-03-21 RX ADMIN — FAMOTIDINE SCH MG: 10 INJECTION, SOLUTION INTRAVENOUS at 08:56

## 2018-03-21 RX ADMIN — STANDARDIZED SENNA CONCENTRATE AND DOCUSATE SODIUM SCH TAB: 8.6; 5 TABLET, FILM COATED ORAL at 08:57

## 2018-03-21 RX ADMIN — ASPIRIN SCH MG: 325 TABLET ORAL at 08:57

## 2018-03-21 RX ADMIN — SODIUM CHLORIDE AND POTASSIUM CHLORIDE SCH MLS/HR: 9; 1.49 INJECTION, SOLUTION INTRAVENOUS at 03:53

## 2018-03-21 RX ADMIN — ENOXAPARIN SODIUM SCH MG: 30 INJECTION SUBCUTANEOUS at 20:00

## 2018-03-21 RX ADMIN — CHLORHEXIDINE GLUCONATE SCH PACK: 500 CLOTH TOPICAL at 04:00

## 2018-03-21 RX ADMIN — INSULIN ASPART SCH: 100 INJECTION, SOLUTION INTRAVENOUS; SUBCUTANEOUS at 21:00

## 2018-03-21 RX ADMIN — MORPHINE SULFATE PRN MG: 2 INJECTION, SOLUTION INTRAMUSCULAR; INTRAVENOUS at 03:53

## 2018-03-21 RX ADMIN — COLLAGENASE SANTYL SCH APPLIC: 250 OINTMENT TOPICAL at 13:27

## 2018-03-21 RX ADMIN — GABAPENTIN SCH MG: 100 CAPSULE ORAL at 08:57

## 2018-03-21 RX ADMIN — INSULIN ASPART SCH: 100 INJECTION, SOLUTION INTRAVENOUS; SUBCUTANEOUS at 17:00

## 2018-03-21 RX ADMIN — METOPROLOL TARTRATE SCH MG: 25 TABLET, FILM COATED ORAL at 08:57

## 2018-03-21 RX ADMIN — SODIUM CHLORIDE AND POTASSIUM CHLORIDE SCH MLS/HR: 9; 1.49 INJECTION, SOLUTION INTRAVENOUS at 02:10

## 2018-03-21 RX ADMIN — FOLIC ACID SCH MG: 1 TABLET ORAL at 08:57

## 2018-03-21 RX ADMIN — ENOXAPARIN SODIUM SCH MG: 30 INJECTION SUBCUTANEOUS at 08:56

## 2018-03-21 RX ADMIN — SODIUM CHLORIDE AND POTASSIUM CHLORIDE SCH MLS/HR: 9; 1.49 INJECTION, SOLUTION INTRAVENOUS at 08:50

## 2018-03-21 NOTE — PD.WCN.NOT
Wound Consult


Description:


Wound consult ordered by  for chronic sacral wound


Communicated with:


Gretchen HERNANDEZ [address] [place], Dr.Samuel Archer


Recommendation:


1. Reposition patient every 2 hours for comfort and offloading.Please do not 

use cotton underpads only UltraSorb moisture wicking.


2. Apply Santyl 2mm thick to wound base making contact with all open tissue.( 

DO NOT USE WOUND CLEANSER WITH SANTYL)Skin prep periwound


3. Cover with moisten gauze secure with ABD or boarder gauze change dressing 

daily or as needed for dislodgement/exudate.


4. Follow up with out patient wound center


Additional Information:


Patient was seen today on [facility] by writer and Gretchen ROSA [facility] for assessment 

of chronic sacral wound.Patient alert and oriented x3 sitting in chair upon 

writers arrival.Patient required min assistance to transfer into bed.Patient 

was reposition to left side with assistance of Gretchen HERNANDEZ.Dressing removed from 

sacral region to reveal a unstageable pressure injury measuring 9.6cm x 12.9cm 

x 0.3cm. Wound edges are even with wound base irregular in shape.Wound base 

extents from sacral region to upper bilateral buttocks.Wound base is ~65% 

adhered yellow slough ~25% pink non granular tissue ~10% beefy red granulation 

noted to 10 O'clock region of wound.moderate serosanguineous drainage noted to 

prior dressing with faint odor.Wound cleansed with normal saline pat dry 

moistened gauze applied to wound base and covered with boarder gauze till 

Santyl available Gretchen HERNANDEZ will change dressing when Santyl arrives.Cotton 

underpad removed from under patient to reduce moister/heat.Patient tolerated 

wound care well.Patient was repositioned to left side with pillows for comfort/

offloading.











[facility] Mar 21, 2018 10:55

## 2018-03-21 NOTE — HHI.PR
Subjective


Remarks


Patient is in good spirits today, says he feels fine.  He states that he has 

had a few hospitalizations within recent weeks for DKA.





Objective


Vitals





Vital Signs








  Date Time  Temp Pulse Resp B/P (MAP) Pulse Ox O2 Delivery O2 Flow Rate FiO2


 


3/21/18 14:00  61      


 


3/21/18 12:00  60      


 


3/21/18 12:00 98.2 60 15 106/67 (80) 96   


 


3/21/18 10:00    108/68 (81)    


 


3/21/18 10:00  63      


 


3/21/18 08:00  68      


 


3/21/18 08:00 98.1 68 20 177/99 (125) 94   


 


3/21/18 07:00     94 Room Air  


 


3/21/18 06:00  67      


 


3/21/18 04:00  63      


 


3/21/18 04:00 98.2 63 12 129/60 (83) 97   


 


3/21/18 03:58   11     


 


3/21/18 02:00  73      


 


3/21/18 00:00  87      


 


3/21/18 00:00 98.0 83 14 110/56 (74) 94   


 


3/20/18 22:00  87      


 


3/20/18 20:00 98.2 78 18 133/62 (85) 97   


 


3/20/18 20:00  78      


 


3/20/18 19:00     97 Room Air  


 


3/20/18 18:00  83      


 


3/20/18 16:00 98.7 82 25 101/55 (70) 97   


 


3/20/18 16:00  82      














I/O      


 


 3/20/18 3/20/18 3/20/18 3/21/18 3/21/18 3/21/18





 07:00 15:00 23:00 07:00 15:00 23:00


 


Intake Total 2190 ml 1710 ml  240 ml  


 


Output Total  600 ml  700 ml  


 


Balance 2190 ml 1110 ml  -460 ml  


 


      


 


Intake Oral  360 ml  240 ml  


 


IV Total 2190 ml 1350 ml    


 


Output Urine Total  600 ml  700 ml  


 


# Bowel Movements    0  








Result Diagram:  


3/20/18 0345                                                                   

             3/21/18 0531





Objective Remarks


GENERAL: Well-nourished, well-developed patient.


SKIN: Sacral wound examined by wound care nurse


HEAD: Normocephalic.


EYES: No scleral icterus. No injection or drainage. 


NECK: Supple, trachea midline. No JVD or lymphadenopathy.


CARDIOVASCULAR: Regular rate and rhythm without murmurs, gallops, or rubs. 


RESPIRATORY: Breath sounds equal bilaterally. No accessory muscle use.


GASTROINTESTINAL: Abdomen soft, non-tender, nondistended. 


EXTREMITIES: No cyanosis, or edema. 


NEUROLOGICAL: Awake, alert, and oriented x 3. Non-focal.





A/P


Problem List:  


(1) DKA (diabetic ketoacidoses)


ICD Code:  E13.10 - Other specified diabetes mellitus with ketoacidosis without 

coma


Status:  Acute


(2) Dehydration


ICD Code:  E86.0 - Dehydration


Status:  Acute


Assessment and Plan








DKA (type 2 diabetes)


Patient has responded well to IV insulin protocol


Pan cultures are thus far negative, chest x-ray was negative


Consider sacral wound


Continue with diabetic diet, Accu-Cheks with sliding scale insulin coverage





Acute renal failure


Resolved with IV fluid hydration and correction of DKA





Hypertension


Continue with metoprolol


Lisinopril held due to acute kidney injury, may resume after discharge





DVT prophylaxis


Heparin





Problem Qualifiers





(1) DKA (diabetic ketoacidoses):  


Qualified Codes:  E10.10 - Type 1 diabetes mellitus with ketoacidosis without 

coma








Ramses Archer MD Mar 21, 2018 15:45

## 2018-03-22 VITALS
HEART RATE: 66 BPM | DIASTOLIC BLOOD PRESSURE: 63 MMHG | RESPIRATION RATE: 18 BRPM | OXYGEN SATURATION: 95 % | TEMPERATURE: 98.7 F | SYSTOLIC BLOOD PRESSURE: 106 MMHG

## 2018-03-22 VITALS
TEMPERATURE: 98 F | DIASTOLIC BLOOD PRESSURE: 79 MMHG | HEART RATE: 65 BPM | RESPIRATION RATE: 20 BRPM | OXYGEN SATURATION: 98 % | SYSTOLIC BLOOD PRESSURE: 121 MMHG

## 2018-03-22 VITALS
DIASTOLIC BLOOD PRESSURE: 73 MMHG | OXYGEN SATURATION: 98 % | HEART RATE: 84 BPM | SYSTOLIC BLOOD PRESSURE: 112 MMHG | TEMPERATURE: 97 F | RESPIRATION RATE: 20 BRPM

## 2018-03-22 VITALS
TEMPERATURE: 98.1 F | DIASTOLIC BLOOD PRESSURE: 83 MMHG | HEART RATE: 88 BPM | OXYGEN SATURATION: 97 % | SYSTOLIC BLOOD PRESSURE: 147 MMHG | RESPIRATION RATE: 17 BRPM

## 2018-03-22 PROCEDURE — 0JB70ZZ EXCISION OF BACK SUBCUTANEOUS TISSUE AND FASCIA, OPEN APPROACH: ICD-10-PCS | Performed by: FAMILY MEDICINE

## 2018-03-22 RX ADMIN — ASPIRIN SCH MG: 325 TABLET ORAL at 09:56

## 2018-03-22 RX ADMIN — FOLIC ACID SCH MG: 1 TABLET ORAL at 09:56

## 2018-03-22 RX ADMIN — INSULIN ASPART SCH: 100 INJECTION, SOLUTION INTRAVENOUS; SUBCUTANEOUS at 17:00

## 2018-03-22 RX ADMIN — STANDARDIZED SENNA CONCENTRATE AND DOCUSATE SODIUM SCH TAB: 8.6; 5 TABLET, FILM COATED ORAL at 00:07

## 2018-03-22 RX ADMIN — ENOXAPARIN SODIUM SCH MG: 30 INJECTION SUBCUTANEOUS at 09:55

## 2018-03-22 RX ADMIN — ENOXAPARIN SODIUM SCH MG: 30 INJECTION SUBCUTANEOUS at 23:29

## 2018-03-22 RX ADMIN — Medication SCH MG: at 09:56

## 2018-03-22 RX ADMIN — ACYCLOVIR SCH UNITS: 800 TABLET ORAL at 23:31

## 2018-03-22 RX ADMIN — INSULIN ASPART SCH: 100 INJECTION, SOLUTION INTRAVENOUS; SUBCUTANEOUS at 08:00

## 2018-03-22 RX ADMIN — STANDARDIZED SENNA CONCENTRATE AND DOCUSATE SODIUM SCH TAB: 8.6; 5 TABLET, FILM COATED ORAL at 09:56

## 2018-03-22 RX ADMIN — FAMOTIDINE SCH MG: 10 INJECTION, SOLUTION INTRAVENOUS at 23:37

## 2018-03-22 RX ADMIN — FAMOTIDINE SCH MG: 10 INJECTION, SOLUTION INTRAVENOUS at 00:05

## 2018-03-22 RX ADMIN — METOPROLOL TARTRATE SCH MG: 25 TABLET, FILM COATED ORAL at 09:56

## 2018-03-22 RX ADMIN — INSULIN ASPART SCH: 100 INJECTION, SOLUTION INTRAVENOUS; SUBCUTANEOUS at 23:31

## 2018-03-22 RX ADMIN — CHLORHEXIDINE GLUCONATE SCH PACK: 500 CLOTH TOPICAL at 04:00

## 2018-03-22 RX ADMIN — METOPROLOL TARTRATE SCH MG: 25 TABLET, FILM COATED ORAL at 23:29

## 2018-03-22 RX ADMIN — GABAPENTIN SCH MG: 100 CAPSULE ORAL at 12:37

## 2018-03-22 RX ADMIN — GABAPENTIN SCH MG: 100 CAPSULE ORAL at 18:00

## 2018-03-22 RX ADMIN — COLLAGENASE SANTYL SCH APPLIC: 250 OINTMENT TOPICAL at 09:32

## 2018-03-22 RX ADMIN — STANDARDIZED SENNA CONCENTRATE AND DOCUSATE SODIUM SCH TAB: 8.6; 5 TABLET, FILM COATED ORAL at 21:00

## 2018-03-22 RX ADMIN — FAMOTIDINE SCH MG: 10 INJECTION, SOLUTION INTRAVENOUS at 10:00

## 2018-03-22 RX ADMIN — INSULIN ASPART SCH: 100 INJECTION, SOLUTION INTRAVENOUS; SUBCUTANEOUS at 12:00

## 2018-03-22 RX ADMIN — GABAPENTIN SCH MG: 100 CAPSULE ORAL at 09:55

## 2018-03-22 RX ADMIN — ACYCLOVIR SCH UNITS: 800 TABLET ORAL at 09:00

## 2018-03-22 RX ADMIN — METOPROLOL TARTRATE SCH MG: 25 TABLET, FILM COATED ORAL at 00:07

## 2018-03-22 NOTE — HHI.PR
Subjective


Remarks


Patient underwent debridement of the sacral decubitus today and had some pain 

following.  He is stable from a DKA standpoint.  He denies any nausea or 

vomiting, denies fever.





Objective


Vitals





Vital Signs








  Date Time  Temp Pulse Resp B/P (MAP) Pulse Ox O2 Delivery O2 Flow Rate FiO2


 


3/22/18 08:09 98.0 65 20 121/79 (93) 98   


 


3/22/18 04:00 98.7 66 18 106/63 (77) 95   


 


3/22/18 00:00 97.0 84 20 112/73 (86) 98   


 


3/21/18 20:00 97.2 67 18 106/62 (77) 97   


 


3/21/18 19:00     96 Room Air  


 


3/21/18 18:00  63      














I/O      


 


 3/21/18 3/21/18 3/21/18 3/22/18 3/22/18 3/22/18





 07:00 15:00 23:00 07:00 15:00 23:00


 


Intake Total 240 ml 633 ml 760 ml   


 


Output Total 700 ml  475 ml 750 ml  


 


Balance -460 ml 633 ml 285 ml -750 ml  


 


      


 


Intake Oral 240 ml  760 ml   


 


IV Total  633 ml    


 


Output Urine Total 700 ml  475 ml 750 ml  


 


# Bowel Movements 0  0   








Result Diagram:  


3/21/18 1526                                                                   

             3/21/18 1526





Objective Remarks


GENERAL: Well-nourished, well-developed patient.


SKIN: Sacral wound examined by wound care nurse


HEAD: Normocephalic.


EYES: No scleral icterus. No injection or drainage. 


NECK: Supple, trachea midline. No JVD or lymphadenopathy.


CARDIOVASCULAR: Regular rate and rhythm without murmurs, gallops, or rubs. 


RESPIRATORY: Breath sounds equal bilaterally. No accessory muscle use.


GASTROINTESTINAL: Abdomen soft, non-tender, nondistended. 


EXTREMITIES: No cyanosis, or edema. 


NEUROLOGICAL: Awake, alert, and oriented x 3. Non-focal.





A/P


Problem List:  


(1) DKA (diabetic ketoacidoses)


ICD Code:  E13.10 - Other specified diabetes mellitus with ketoacidosis without 

coma


Status:  Acute


(2) Dehydration


ICD Code:  E86.0 - Dehydration


Status:  Acute


Assessment and Plan








Sacral decubitus


Wound is currently unstageable, lacking epidermal layer


Positioning on side recommended, dressing was placed


Wound care follow-up as outpatient in 1 week


Appreciate wound care consult





DKA (type 2 diabetes)


Blood sugars are now stable with regular sliding scale


Pan cultures are negative 2 days, chest x-ray was negative


Continue with diabetic diet, Accu-Cheks with sliding scale insulin coverage





Acute renal failure


Resolved with IV fluid hydration and correction of DKA





Hypertension


Continue with metoprolol


Lisinopril held due to acute kidney injury, may resume after discharge





DVT prophylaxis


Heparin





Discharge planning


Patient came to us from rehab center, likely discharge tomorrow





Problem Qualifiers





(1) DKA (diabetic ketoacidoses):  


Qualified Codes:  E10.10 - Type 1 diabetes mellitus with ketoacidosis without 

coma








Ramses Archer MD Mar 22, 2018 16:35

## 2018-03-22 NOTE — PD.WCN.NOT
Wound Consult


Description:


Patient seen for wound care physician consult with Doctor Whaley today.


Communicated with:


DAVID briceno, Doctor Whaley, and Doctor Ramses Archer.


Recommendation:


1. Reposition patient every 2 hours for comfort and offloading.Please do not 

use cotton underpads only UltraSorb moisture wicking.


2. Cleanse wound with normal saline only


3. Apply Santyl 2mm thick to wound base of sacrococcygeal wound making contact 

with all open tissue.( DO NOT USE WOUND CLEANSER WITH SANTYL)


4. Apply Calazime barrier cream to periwound


5. Cover with moisten gauze, dry gauze and secure with ABD pad secured with 

medfix tape change dressing daily or as needed for dislodgement/exudate.


6. Please apply skin barrier film (skin prep) to intact skin before securing 

dressing with tape.


7. Follow up with out patient wound center.


8. Please encourage and assist patient to turn and reposition patient every 2 

hours and PRN for comfort and offloading of pressure from celine prominences.


Additional Information:


Patient seen [facility] for wound care physician consult with Doctor Whaley. 

Patient was also seen with DAVID briceno, DAVID briceno and writer. 

Patient was able to turn self with minimal assistance to R side. Removed 

dressing of ABD pad, gauze pad in place to reveal open wound to Sacrococcygeal 

area. Wound measures 10cm x 8.8cm x slough. Wound presents with ~50% yellow 

adherent slough,~10 % adipose tissue, and ~40% red granulation tissue. Wound 

bed dry with scant sero-sanguinous drainage.Wound was debrided with Curette by 

doctor Whaley at bedside. Wound was then cleansed with normal saline and patted 

dry. Applied skin barrier film to periwound. Applied Santyl to saline moistened 

gauze pads and applied just over wound bed. Covered wound with dry 4x4 gauze 

pads, and ABD pad. Secured dressing with medfix tape.











[facility]th [facility] Mar 22, 2018 11:13

## 2018-03-22 NOTE — PD.WOU.CON
Patient Intake


Chief Complaint


Buttock ulcer


Consult Requested by


Dr. Goodwin


Reason for Consult


Patient with Buttock Ulcer, Type 2 Diabetes


Primary Care Physician


Tod Barreto, DO


History of Present Illness


Patient seen today for evaluation of a buttock ulcer which she has had since 

Christmas of last year.  He reports that he fell on his buttocks last year and 

since then has had this wound.  He reports that he has been having wound care 

here at the hospital as well as home health wound care.  Patient has a history 

of type 2 diabetes and reports that he was diagnosed with this 25 years ago.


Coded Allergies:  


     No Known Allergies (Verified  Allergy, Unknown, 3/20/18)





 Vital Signs








  Date Time  Temp Pulse Resp B/P (MAP) Pulse Ox O2 Delivery O2 Flow Rate FiO2


 


3/22/18 08:09 98.0 65 20 121/79 (93) 98   


 


3/22/18 04:00 98.7 66 18 106/63 (77) 95   


 


3/22/18 00:00 97.0 84 20 112/73 (86) 98   


 


3/21/18 20:00 97.2 67 18 106/62 (77) 97   


 


3/21/18 19:00     96 Room Air  


 


3/21/18 18:00  63      


 


3/21/18 16:00  63      


 


3/21/18 16:00 98.3 63 14 128/79 (95) 94   


 


3/21/18 14:00  61      


 


3/21/18 12:00  60      


 


3/21/18 12:00 98.2 60 15 106/67 (80) 96   











Lab and Radiology Results


Radiology





Last Impressions








Abdomen/Pelvis CT 3/20/18 2626 Signed





Impressions: 





 Service Date/Time:  Tuesday, March 20, 2018 05:44 - CONCLUSION:  Moderate 

amount 





 of ascites in the abdomen and pelvis and tiny right pleural effusion.     

Rodríguez Chao MD 


 


Chest X-Ray 3/20/18 0333 Signed





Impressions: 





 Service Date/Time:  Tuesday, March 20, 2018 04:01 - CONCLUSION: The lungs are 





 clear.     Rodríguez Chao MD 











Assessment/Plan


Problem List:  


(1) Decubitus ulcer of buttock, unstageable


(2) HTN (hypertension)


Status:  Acute


(3) Diabetes


Status:  Acute


(4) DKA (diabetic ketoacidoses)


Status:  Acute





Problem Qualifiers





(1) DKA (diabetic ketoacidoses):  


Qualified Codes:  E10.10 - Type 1 diabetes mellitus with ketoacidosis without 

coma








Elina Whaley MD Mar 22, 2018 10:27

## 2018-03-23 VITALS
TEMPERATURE: 97.8 F | HEART RATE: 75 BPM | DIASTOLIC BLOOD PRESSURE: 74 MMHG | OXYGEN SATURATION: 98 % | SYSTOLIC BLOOD PRESSURE: 136 MMHG | RESPIRATION RATE: 18 BRPM

## 2018-03-23 VITALS
SYSTOLIC BLOOD PRESSURE: 141 MMHG | DIASTOLIC BLOOD PRESSURE: 70 MMHG | TEMPERATURE: 98.2 F | OXYGEN SATURATION: 98 % | HEART RATE: 77 BPM | RESPIRATION RATE: 18 BRPM

## 2018-03-23 RX ADMIN — INSULIN ASPART SCH: 100 INJECTION, SOLUTION INTRAVENOUS; SUBCUTANEOUS at 08:00

## 2018-03-23 RX ADMIN — COLLAGENASE SANTYL SCH APPLIC: 250 OINTMENT TOPICAL at 09:00

## 2018-03-23 RX ADMIN — INSULIN ASPART SCH: 100 INJECTION, SOLUTION INTRAVENOUS; SUBCUTANEOUS at 12:00

## 2018-03-23 RX ADMIN — Medication SCH MG: at 10:27

## 2018-03-23 RX ADMIN — METOPROLOL TARTRATE SCH MG: 25 TABLET, FILM COATED ORAL at 10:28

## 2018-03-23 RX ADMIN — STANDARDIZED SENNA CONCENTRATE AND DOCUSATE SODIUM SCH TAB: 8.6; 5 TABLET, FILM COATED ORAL at 10:27

## 2018-03-23 RX ADMIN — FOLIC ACID SCH MG: 1 TABLET ORAL at 10:28

## 2018-03-23 RX ADMIN — ENOXAPARIN SODIUM SCH MG: 30 INJECTION SUBCUTANEOUS at 10:30

## 2018-03-23 RX ADMIN — GABAPENTIN SCH MG: 100 CAPSULE ORAL at 10:28

## 2018-03-23 RX ADMIN — CHLORHEXIDINE GLUCONATE SCH PACK: 500 CLOTH TOPICAL at 04:00

## 2018-03-23 RX ADMIN — GABAPENTIN SCH MG: 100 CAPSULE ORAL at 13:00

## 2018-03-23 RX ADMIN — ACYCLOVIR SCH UNITS: 800 TABLET ORAL at 09:00

## 2018-03-23 RX ADMIN — ASPIRIN SCH MG: 325 TABLET ORAL at 10:28

## 2018-03-23 RX ADMIN — FAMOTIDINE SCH MG: 10 INJECTION, SOLUTION INTRAVENOUS at 10:30

## 2018-03-23 NOTE — HHI.DS
__________________________________________________





Discharge Summary


Admission Date


Mar 20, 2018 at 05:15


Discharge Date:  Mar 23, 2018


Admitting Diagnosis





DKA, leukocytosis





(1) DKA (diabetic ketoacidoses)


ICD Code:  E13.10 - Other specified diabetes mellitus with ketoacidosis without 

coma


Status:  Acute


(2) Dehydration


ICD Code:  E86.0 - Dehydration


Status:  Acute


Procedures


Sacral wound debridement with wound care physician


Brief History - From Admission


68 year old male presents with a history altered mentation noted at 2 AM by the 

nursing home staff when they went to check his blood sugar.  The patient's 

blood sugar was noted to be critically high.  They administered 10 units of 

insulin subcutaneously and called the physician on call.  Given the patient's 

altered mentation he recommended that the patient be taken to the emergency 

department.  Upon ambulance services arrival the patient was noted to be 

tachypneic with a critically high blood sugar.  The patient was noted to have 

an end-tidal CO2 between 8 and 10.  The patient's heart rate was noted to be 

280 and he appeared to be and ventricular tachycardia.  The patient 

spontaneously converted to a sinus rhythm.  The patient continues to be 

altered.  The initial workup in the emergency department show severe DKA and 

the patient is admitted to ICU.


CBC/BMP:  


3/21/18 1526                                                                   

             3/21/18 1526





Significant Findings





Laboratory Tests








Test


  3/20/18


10:00 3/20/18


10:40 3/20/18


12:10 3/20/18


15:46


 


Lactic Acid Level


  5.2 mmol/L


(0.4-2.0) 


  


  


 


 


Blood Urea Nitrogen


  


  22 MG/DL


(7-18) 


  20 MG/DL


(7-18)


 


Creatinine


  


  1.65 MG/DL


(0.60-1.30) 


  


 


 


Random Glucose


  


  354 MG/DL


() 


  143 MG/DL


()


 


Calcium Level


  


  8.3 MG/DL


(8.5-10.1) 


  7.5 MG/DL


(8.5-10.1)


 


Phosphorus Level


  


  2.1 MG/DL


(2.5-4.9) 


  1.5 MG/DL


(2.5-4.9)


 


Estimat Glomerular Filtration


Rate 


  42 ML/MIN


(>89) 


  60 ML/MIN


(>89)


 


Troponin I


  


  


  0.28 NG/ML


(0.02-0.05) 


 


 


Chloride Level


  


  


  


  108 MEQ/L


()


 


Hemoglobin A1c


  


  


  


  6.4 %


(4.3-6.0)


 


Test


  3/20/18


23:16 3/21/18


05:31 3/21/18


15:26 


 


 


Blood Urea Nitrogen


  20 MG/DL


(7-18) 19 MG/DL


(7-18) 


  


 


 


Random Glucose


  277 MG/DL


() 138 MG/DL


() 


  


 


 


Calcium Level


  7.9 MG/DL


(8.5-10.1) 7.5 MG/DL


(8.5-10.1) 7.5 MG/DL


(8.5-10.1) 


 


 


Phosphorus Level


  1.8 MG/DL


(2.5-4.9) 1.6 MG/DL


(2.5-4.9) 1.5 MG/DL


(2.5-4.9) 


 


 


Carbon Dioxide Level


  18.7 MEQ/L


(21.0-32.0) 


  


  


 


 


Estimat Glomerular Filtration


Rate 67 ML/MIN


(>89) 67 ML/MIN


(>89) 


  


 


 


Prothrombin Time


  


  13.6 SEC


(9.8-11.6) 


  


 


 


Total Protein


  


  4.4 GM/DL


(6.4-8.2) 


  


 


 


Albumin


  


  1.8 GM/DL


(3.4-5.0) 


  


 


 


Chloride Level


  


  108 MEQ/L


() 108 MEQ/L


() 


 


 


Red Blood Count


  


  


  3.10 MIL/MM3


(4.50-5.90) 


 


 


Hemoglobin


  


  


  10.0 GM/DL


(13.0-17.0) 


 


 


Hematocrit


  


  


  29.7 %


(39.0-51.0) 


 


 


Platelet Count


  


  


  85 TH/MM3


(150-450) 


 


 


Platelet Estimate   LOW (NORMAL)  








PE at Discharge


GENERAL: Well-nourished, well-developed patient.


SKIN: Sacral wound examined by wound care nurse


HEAD: Normocephalic.


EYES: No scleral icterus. No injection or drainage. 


NECK: Supple, trachea midline. No JVD or lymphadenopathy.


CARDIOVASCULAR: Regular rate and rhythm without murmurs, gallops, or rubs. 


RESPIRATORY: Breath sounds equal bilaterally. No accessory muscle use.


GASTROINTESTINAL: Abdomen soft, non-tender, nondistended. 


EXTREMITIES: No cyanosis, or edema. 


NEUROLOGICAL: Awake, alert, and oriented x 3. Non-focal.


Hospital Course


68M with h/o DM2 who presented to the ER in DKA, blood sugars into the 700's.  

He responded well to DKA protocol and had resolution of his symptoms within 12 

hours, indicating early stage DKA.  Work up to reveal exacerbating issues found 

a decubitus sacral ulceration that was evaluated by wound care physician during 

this stay.  He has follow up arranged for ongoing wound care debridement.  He 

has remained well controlled from a blood sugar standpoint and came to us from 

a SNF-Rehab where he is stable to return to today.


Pt Condition on Discharge:  Good


Discharge Disposition:  Discharge to SNF


Discharge Time:  <= 30 minutes


Discharge Instructions


DIET: Follow Instructions for:  Diabetic Diet


Activities you can perform:  Weight Bearing as Ramses Kumar MD Mar 23, 2018 09:40

## 2018-03-26 ENCOUNTER — HOSPITAL ENCOUNTER (EMERGENCY)
Dept: HOSPITAL 17 - NEPC | Age: 68
Discharge: HOME | End: 2018-03-26
Payer: MEDICARE

## 2018-03-26 VITALS
SYSTOLIC BLOOD PRESSURE: 168 MMHG | TEMPERATURE: 99.3 F | HEART RATE: 83 BPM | OXYGEN SATURATION: 96 % | RESPIRATION RATE: 15 BRPM | DIASTOLIC BLOOD PRESSURE: 95 MMHG

## 2018-03-26 VITALS — RESPIRATION RATE: 20 BRPM | OXYGEN SATURATION: 98 %

## 2018-03-26 VITALS — RESPIRATION RATE: 20 BRPM

## 2018-03-26 VITALS — DIASTOLIC BLOOD PRESSURE: 89 MMHG | SYSTOLIC BLOOD PRESSURE: 165 MMHG

## 2018-03-26 DIAGNOSIS — F12.90: ICD-10-CM

## 2018-03-26 DIAGNOSIS — R18.8: ICD-10-CM

## 2018-03-26 DIAGNOSIS — R10.84: Primary | ICD-10-CM

## 2018-03-26 DIAGNOSIS — Z79.4: ICD-10-CM

## 2018-03-26 DIAGNOSIS — E11.9: ICD-10-CM

## 2018-03-26 LAB
ACANTHOCYTES BLD QL SMEAR: (no result)
ALBUMIN SERPL-MCNC: 2.6 GM/DL (ref 3.4–5)
ALP SERPL-CCNC: 143 U/L (ref 45–117)
ALT SERPL-CCNC: 81 U/L (ref 12–78)
AST SERPL-CCNC: 97 U/L (ref 15–37)
BASOPHILS # BLD AUTO: 0 TH/MM3 (ref 0–0.2)
BASOPHILS NFR BLD: 0.9 % (ref 0–2)
BILIRUB SERPL-MCNC: 0.7 MG/DL (ref 0.2–1)
BUN SERPL-MCNC: 5 MG/DL (ref 7–18)
CALCIUM SERPL-MCNC: 8.7 MG/DL (ref 8.5–10.1)
CHLORIDE SERPL-SCNC: 99 MEQ/L (ref 98–107)
CREAT SERPL-MCNC: 0.79 MG/DL (ref 0.6–1.3)
EOSINOPHIL # BLD: 0.1 TH/MM3 (ref 0–0.4)
EOSINOPHIL NFR BLD: 2.5 % (ref 0–4)
ERYTHROCYTE [DISTWIDTH] IN BLOOD BY AUTOMATED COUNT: 16.8 % (ref 11.6–17.2)
GFR SERPLBLD BASED ON 1.73 SQ M-ARVRAT: 98 ML/MIN (ref 89–?)
GLUCOSE SERPL-MCNC: 176 MG/DL (ref 74–106)
HCO3 BLD-SCNC: 31.8 MEQ/L (ref 21–32)
HCT VFR BLD CALC: 34.4 % (ref 39–51)
HGB BLD-MCNC: 11.7 GM/DL (ref 13–17)
INR PPP: 1 RATIO
LYMPHOCYTES # BLD AUTO: 0.7 TH/MM3 (ref 1–4.8)
LYMPHOCYTES NFR BLD AUTO: 21 % (ref 9–44)
MCH RBC QN AUTO: 32.6 PG (ref 27–34)
MCHC RBC AUTO-ENTMCNC: 34.1 % (ref 32–36)
MCV RBC AUTO: 95.5 FL (ref 80–100)
MONOCYTE #: 0.3 TH/MM3 (ref 0–0.9)
MONOCYTES NFR BLD: 10.3 % (ref 0–8)
NEUTROPHILS # BLD AUTO: 2.2 TH/MM3 (ref 1.8–7.7)
NEUTROPHILS NFR BLD AUTO: 65.3 % (ref 16–70)
PLATELET # BLD: 94 TH/MM3 (ref 150–450)
PMV BLD AUTO: 8.5 FL (ref 7–11)
PROT SERPL-MCNC: 6.1 GM/DL (ref 6.4–8.2)
PROTHROMBIN TIME: 10.6 SEC (ref 9.8–11.6)
RBC # BLD AUTO: 3.6 MIL/MM3 (ref 4.5–5.9)
SODIUM SERPL-SCNC: 135 MEQ/L (ref 136–145)
SPHEROCYTES BLD QL SMEAR: (no result)
WBC # BLD AUTO: 3.4 TH/MM3 (ref 4–11)

## 2018-03-26 PROCEDURE — 99285 EMERGENCY DEPT VISIT HI MDM: CPT

## 2018-03-26 PROCEDURE — 96374 THER/PROPH/DIAG INJ IV PUSH: CPT

## 2018-03-26 PROCEDURE — 96375 TX/PRO/DX INJ NEW DRUG ADDON: CPT

## 2018-03-26 PROCEDURE — 85610 PROTHROMBIN TIME: CPT

## 2018-03-26 PROCEDURE — 85025 COMPLETE CBC W/AUTO DIFF WBC: CPT

## 2018-03-26 PROCEDURE — 80053 COMPREHEN METABOLIC PANEL: CPT

## 2018-03-26 PROCEDURE — 74177 CT ABD & PELVIS W/CONTRAST: CPT

## 2018-03-26 PROCEDURE — 93005 ELECTROCARDIOGRAM TRACING: CPT

## 2018-03-26 PROCEDURE — 83690 ASSAY OF LIPASE: CPT

## 2018-03-26 PROCEDURE — 85730 THROMBOPLASTIN TIME PARTIAL: CPT

## 2018-03-26 PROCEDURE — 83605 ASSAY OF LACTIC ACID: CPT

## 2018-03-26 PROCEDURE — 96361 HYDRATE IV INFUSION ADD-ON: CPT

## 2018-03-26 RX ADMIN — PHENYTOIN SODIUM SCH MLS/HR: 50 INJECTION INTRAMUSCULAR; INTRAVENOUS at 19:03

## 2018-03-26 RX ADMIN — PHENYTOIN SODIUM SCH MLS/HR: 50 INJECTION INTRAMUSCULAR; INTRAVENOUS at 21:57

## 2018-03-26 RX ADMIN — Medication PRN ML: at 22:15

## 2018-03-26 NOTE — PD
HPI


Chief Complaint:  Abdominal Pain


Time Seen by Provider:  18:58


Travel History


International Travel<30 days:  No


Contact w/Intl Traveler<30days:  No


Traveled to known affect area:  No





History of Present Illness


HPI


The patient is a 68-year-old  male who presents to the emergency 

department for abdominal pain and indigestion.  The patient was recently 

hospitalized for DKA.  He was discharged back to the facility, however, earlier 

today he developed abdominal pain.  Abdominal pain is generalized, diffuse, 

moderate, and associated with indigestion.  He denies any nausea or vomiting, 

was able to tolerate a small amount of lunch.  He denies any diarrhea.  He 

denies any fever, chills, or sweats.  He denies any associated dysuria.  He 

denies any previous abdominal surgeries.  The patient denies any known history 

of cholelithiasis, pancreatitis, or nephrolithiasis.





PFSH


Past Medical History


Arthritis:  Yes (knees, occas.)


Blood Disorders:  No


Bipolar Disorder:  Yes


Cancer:  No


Cardiovascular Problems:  Yes


High Cholesterol:  No


Diabetes:  Yes


Patient Takes Glucophage:  Yes


Diminished Hearing:  No


Endocrine:  Yes


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


Genitourinary:  No


Headaches:  Yes (occas.)


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


Hypertension:  Yes


Immune Disorder:  No


Implanted Vascular Access Dvce:  No


Musculoskeletal:  No


Neurologic:  No


Psychiatric:  Yes (bipolar)


Reproductive:  No


Respiratory:  No


Immunizations Current:  Yes


Thyroid Disease:  No





Past Surgical History


Abdominal Surgery:  No


Appendectomy:  No


Cardiac Surgery:  No


Cholecystectomy:  No


Ear Surgery:  No


Endocrine Surgery:  No


Eye Surgery:  No


Genitourinary Surgery:  No


Gynecologic Surgery:  No


Joint Replacement:  Yes (LEFT KNEE)


Neurologic Surgery:  No


Oral Surgery:  No


Pacemaker:  No


Thoracic Surgery:  No


Other Surgery:  Yes (femur fx hx)





Social History


Alcohol Use:  Yes (occas)


Tobacco Use:  No


Substance Use:  Yes (marijuana)





Allergies-Medications


(Allergen,Severity, Reaction):  


Coded Allergies:  


     No Known Allergies (Verified  Allergy, Unknown, 3/26/18)


Reported Meds & Prescriptions





Reported Meds & Active Scripts


Active


Gnp Senna Plus 8.6-50 mg (Sennosides-Docusate Sodium) 8.6 Mg-50 Mg Tab 1 Tab PO 

BID PRN 30 Days


Norco 5-325 mg (Hydrocodone-Acetaminophen 5-325 mg) 1 Tab 1 Tab PO Q8 PRN


Trazodone Hcl (Trazodone HCl) 50 Mg Tab 50 Mg PO HS


Abilify 20 mg (Aripiprazole) 20 Mg Tab 20 Mg PO HS 30 Days


Metoprolol Tartrate 25 mg (Metoprolol Tartrate) 25 Mg Tab 25 Mg PO Q12HR 30 Days


Lovenox (Enoxaparin Sodium) 30 Mg/0.3 Ml Inj 30 Mg SQ Q12H 30 Days


Reported


Glucophage 500 mg (Metformin HCl) 500 Mg Tab 500 Mg PO BIDPC


Mapap (Acetaminophen) 325 Mg Tab 650 Mg PO Q6H PRN


Thiamine HCl 100 Mg Tab 100 Mg PO DAILY


Lisinopril 5 mg (Lisinopril) 5 Mg Tab 5 Mg PO BID


Humalog Insulin Supplemental Scale (Insulin Human Lispro) 100 Units/Ml  Inj 

Unknown Dose SQ TIDACHS


     Low Dose Lispro Insulin Sliding Scale = Max dose at bedtime:(  )units;


     Max dose at 3am:(  ); blood sugars less than 70 take zero insulin


     units; blood sugars 150-199 take 1 unit; blood sugars 200-249 take 3


     units; blood sugars 250-299 take 5 units; blood sugars 300-349 take 7


     units; blood sugars greater than 349 take 9 units


Lantus (Insulin Glargine) 100 Units/Ml Inj 27 Unit SQ DAILY


Gabapentin 100 Mg Cap 200 Mg PO TID


Folate 1 Mg Tab (Folic Acid) 1 Mg Tab 1 Mg PO DAILY








Review of Systems


Except as stated in HPI:  all other systems reviewed are Neg


General / Constitutional:  No: Fever


Cardiovascular:  No: Chest Pain or Discomfort


Respiratory:  No: Shortness of Breath


Gastrointestinal:  Positive: Abdominal Pain, Indigestion, No: Nausea, Vomiting, 

Diarrhea


Genitourinary:  No: Dysuria





Physical Exam


Narrative


GENERAL: Awake, alert, pleasant 68-year-old male who appears his stated age and 

is in no acute respiratory distress.


SKIN: Focused skin assessment warm/dry.


HEAD: Atraumatic. Normocephalic. 


EYES: Pupils equal and round. No scleral icterus. No injection or drainage. 


ENT: No nasal bleeding or discharge.  Dry mucous membranes.


NECK: Trachea midline. No JVD. 


CARDIOVASCULAR: Regular rate and rhythm.  No murmur appreciated.  Heart rate in 

the 80s.


RESPIRATORY: No accessory muscle use. Clear to auscultation. Breath sounds 

equal bilaterally. 


GASTROINTESTINAL: Abdomen soft, diffusely tender.  Ecchymosis noted on the 

lower abdomen bilaterally. 


MUSCULOSKELETAL: No obvious deformities. No clubbing.  No cyanosis.  No edema. 


NEUROLOGICAL: Awake and alert. No obvious cranial nerve deficits.  Motor 

grossly within normal limits. Normal speech.


PSYCHIATRIC: Appropriate mood and affect; insight and judgment normal.





Data


Data


Last Documented VS





Vital Signs








  Date Time  Temp Pulse Resp B/P (MAP) Pulse Ox O2 Delivery O2 Flow Rate FiO2


 


3/26/18 22:29  78 20 165/89 (114) 99   


 


3/26/18 19:28      Room Air  


 


3/26/18 18:37 99.3       








Orders





 Orders


Complete Blood Count With Diff (3/26/18 19:03)


Comprehensive Metabolic Panel (3/26/18 19:03)


Lipase (3/26/18 19:03)


Lactic Acid (3/26/18 19:03)


Prothrombin Time / Inr (Pt) (3/26/18 19:03)


Act Partial Throm Time (Ptt) (3/26/18 19:03)


Ct Abd/Pel W Iv Contrast(Rout) (3/26/18 19:03)


Iv Access Insert/Monitor (3/26/18 19:03)


Ecg Monitoring (3/26/18 19:03)


Oximetry (3/26/18 19:03)


Morphine Inj (Morphine Inj) (3/26/18 19:15)


Ondansetron Inj (Zofran Inj) (3/26/18 19:15)


Sodium Chlor 0.9% 1000 Ml Inj (Ns 1000 M (3/26/18 19:03)


Sodium Chloride 0.9% Flush (Ns Flush) (3/26/18 19:15)


Electrocardiogram (3/26/18 19:03)


Famotidine Inj (Pepcid Inj) (3/26/18 19:15)


Sodium Chlor 0.9% 1000 Ml Inj (Ns 1000 M (3/26/18 20:45)


Iohexol 350 Inj (Omnipaque 350 Inj) (3/26/18 21:00)





Labs





Laboratory Tests








Test


  3/26/18


19:10


 


White Blood Count 3.4 TH/MM3 


 


Red Blood Count 3.60 MIL/MM3 


 


Hemoglobin 11.7 GM/DL 


 


Hematocrit 34.4 % 


 


Mean Corpuscular Volume 95.5 FL 


 


Mean Corpuscular Hemoglobin 32.6 PG 


 


Mean Corpuscular Hemoglobin


Concent 34.1 % 


 


 


Red Cell Distribution Width 16.8 % 


 


Platelet Count 94 TH/MM3 


 


Mean Platelet Volume 8.5 FL 


 


Neutrophils (%) (Auto) 65.3 % 


 


Lymphocytes (%) (Auto) 21.0 % 


 


Monocytes (%) (Auto) 10.3 % 


 


Eosinophils (%) (Auto) 2.5 % 


 


Basophils (%) (Auto) 0.9 % 


 


Neutrophils # (Auto) 2.2 TH/MM3 


 


Lymphocytes # (Auto) 0.7 TH/MM3 


 


Monocytes # (Auto) 0.3 TH/MM3 


 


Eosinophils # (Auto) 0.1 TH/MM3 


 


Basophils # (Auto) 0.0 TH/MM3 


 


CBC Comment AUTO DIFF 


 


Differential Comment


  AUTO DIFF


CONFIRMED


 


Platelet Estimate LOW 


 


Platelet Morphology Comment NORMAL 


 


Spherocytes OCC 


 


Acanthocytes OCC 


 


Prothrombin Time 10.6 SEC 


 


Prothromb Time International


Ratio 1.0 RATIO 


 


 


Activated Partial


Thromboplast Time 25.4 SEC 


 


 


Blood Urea Nitrogen 5 MG/DL 


 


Creatinine 0.79 MG/DL 


 


Random Glucose 176 MG/DL 


 


Total Protein 6.1 GM/DL 


 


Albumin 2.6 GM/DL 


 


Calcium Level 8.7 MG/DL 


 


Alkaline Phosphatase 143 U/L 


 


Aspartate Amino Transf


(AST/SGOT) 97 U/L 


 


 


Alanine Aminotransferase


(ALT/SGPT) 81 U/L 


 


 


Total Bilirubin 0.7 MG/DL 


 


Sodium Level 135 MEQ/L 


 


Potassium Level 4.3 MEQ/L 


 


Chloride Level 99 MEQ/L 


 


Carbon Dioxide Level 31.8 MEQ/L 


 


Anion Gap 4 MEQ/L 


 


Estimat Glomerular Filtration


Rate 98 ML/MIN 


 


 


Lactic Acid Level 2.4 mmol/L 


 


Lipase 81 U/L 











MDM


Medical Decision Making


Medical Screen Exam Complete:  Yes


Emergency Medical Condition:  Yes


Medical Record Reviewed:  Yes


Interpretation(s)


EKG reveals normal sinus rhythm with a rate 84.  Low QRS voltage in extremity 

leads.








Laboratory Tests








Test


  3/26/18


19:10


 


White Blood Count 3.4 TH/MM3 


 


Red Blood Count 3.60 MIL/MM3 


 


Hemoglobin 11.7 GM/DL 


 


Hematocrit 34.4 % 


 


Mean Corpuscular Volume 95.5 FL 


 


Mean Corpuscular Hemoglobin 32.6 PG 


 


Mean Corpuscular Hemoglobin


Concent 34.1 % 


 


 


Red Cell Distribution Width 16.8 % 


 


Platelet Count 94 TH/MM3 


 


Mean Platelet Volume 8.5 FL 


 


Neutrophils (%) (Auto) 65.3 % 


 


Lymphocytes (%) (Auto) 21.0 % 


 


Monocytes (%) (Auto) 10.3 % 


 


Eosinophils (%) (Auto) 2.5 % 


 


Basophils (%) (Auto) 0.9 % 


 


Neutrophils # (Auto) 2.2 TH/MM3 


 


Lymphocytes # (Auto) 0.7 TH/MM3 


 


Monocytes # (Auto) 0.3 TH/MM3 


 


Eosinophils # (Auto) 0.1 TH/MM3 


 


Basophils # (Auto) 0.0 TH/MM3 


 


CBC Comment AUTO DIFF 


 


Differential Comment


  AUTO DIFF


CONFIRMED


 


Platelet Estimate LOW 


 


Platelet Morphology Comment NORMAL 


 


Spherocytes OCC 


 


Acanthocytes OCC 


 


Prothrombin Time 10.6 SEC 


 


Prothromb Time International


Ratio 1.0 RATIO 


 


 


Activated Partial


Thromboplast Time 25.4 SEC 


 


 


Blood Urea Nitrogen 5 MG/DL 


 


Creatinine 0.79 MG/DL 


 


Random Glucose 176 MG/DL 


 


Total Protein 6.1 GM/DL 


 


Albumin 2.6 GM/DL 


 


Calcium Level 8.7 MG/DL 


 


Alkaline Phosphatase 143 U/L 


 


Aspartate Amino Transf


(AST/SGOT) 97 U/L 


 


 


Alanine Aminotransferase


(ALT/SGPT) 81 U/L 


 


 


Total Bilirubin 0.7 MG/DL 


 


Sodium Level 135 MEQ/L 


 


Potassium Level 4.3 MEQ/L 


 


Chloride Level 99 MEQ/L 


 


Carbon Dioxide Level 31.8 MEQ/L 


 


Anion Gap 4 MEQ/L 


 


Estimat Glomerular Filtration


Rate 98 ML/MIN 


 


 


Lactic Acid Level 2.4 mmol/L 


 


Lipase 81 U/L 








Differential Diagnosis


Differential diagnosis includes GERD, esophageal spasm, pancreatitis, peptic 

ulcer disease, ileus, small bowel obstruction, cholelithiasis, DKA, dehydration.


Narrative Course


IV was established, labs are drawn and sent, and the patient was placed on 

cardiac telemetry monitoring and continuous pulse oximetry monitoring.  EKG was 

ordered and interpreted.  Patient was administered morphine, Zofran, Pepcid, 

and IV fluids.  CT of the abdomen and pelvis with IV contrast was obtained.  I 

reviewed the EMR, previous CT revealed moderate ascites within the abdomen.  

Patient's albumin was low at 2.6, LFTs were mildly elevated.  The patient's 

white count is mildly low today, 3.4, patient is afebrile, no temperature above 

100.4, however, did have a temperature 99.3.  I reviewed the EMR, no previous 

evidence of spontaneous bacterial peritonitis.  The patient was reevaluated at 

10 PM, his symptoms have significantly improved.  The patient lives in [place] with another gentleman, states he does not have a ride home tonight.  The 

patient does not meet observation or admission criteria, after discussion it 

was agreed we would have case management evaluate the patient to see if there 

is a way we could transport the patient home.





Diagnosis





 Primary Impression:  


 Abdominal pain


 Qualified Codes:  R10.84 - Generalized abdominal pain


 Additional Impression:  


 Ascites


 Qualified Codes:  R18.8 - Other ascites


Patient Instructions:  General Instructions





***Additional Instructions:  


Please provide the patient a copy of his labs and CT results at discharge.  

Case management to arrange transportation back to nursing home.  Return if 

symptoms worsen or progress.


***Med/Other Pt SpecificInfo:  No Change to Meds


Disposition:  01 DISCHARGE HOME


Condition:  Stable











Gomez Joshi MD Mar 26, 2018 19:14

## 2018-03-26 NOTE — RADRPT
EXAM DATE/TIME:  03/26/2018 20:56 

 

[facility] COMPARISON:     

No previous studies available for comparison.

 

 

INDICATIONS :     

Diffuse abdomen pain today.

                      

 

IV CONTRAST:     

95 cc Omnipaque 350 (iohexol) IV 

 

 

ORAL CONTRAST:      

No oral contrast ingested.

                      

 

RADIATION DOSE:     

11.03 CTDIvol (mGy) 

 

 

MEDICAL HISTORY :     

Hepatitis C. Hypertension. diabetes

 

SURGICAL HISTORY :      

None. 

 

ENCOUNTER:      

Initial

 

ACUITY:      

1 day

 

PAIN SCALE:      

6/10

 

LOCATION:       

Bilateral  abdomen

 

TECHNIQUE:     

Volumetric scanning of the abdomen and pelvis was performed.  Using automated exposure control and ad
justment of the mA and/or kV according to patient size, radiation dose was kept as low as reasonably 
achievable to obtain optimal diagnostic quality images.  DICOM format image data is available electro
nically for review and comparison.  

 

FINDINGS:     

Lung bases demonstrate minimal dependent atelectasis. Trace right pleural fluid.

 

There is liver cirrhosis. Spleen is enlarged measuring up to 15.5 cm in length. There is moderate asc
ites. There is extensive varices in the retroperitoneum in the left upper quadrant and around the spl
enic hilum. Also mild coccygeal varices.

 

No bowel obstruction. No free air.

 

CONCLUSION:     

1. Moderate ascites, liver cirrhosis, splenomegaly and varices similar to March 20. Mild anasarca. No
 new findings.

 

 

 

 Jonathon Ordoñez MD on March 26, 2018 at 21:44           

Board Certified Radiologist.

 This report was verified electronically.